# Patient Record
Sex: MALE | Race: WHITE | NOT HISPANIC OR LATINO | Employment: FULL TIME | URBAN - METROPOLITAN AREA
[De-identification: names, ages, dates, MRNs, and addresses within clinical notes are randomized per-mention and may not be internally consistent; named-entity substitution may affect disease eponyms.]

---

## 2023-03-08 PROBLEM — J30.9 ALLERGIC RHINITIS: Status: ACTIVE | Noted: 2023-03-08

## 2023-03-08 PROBLEM — G43.909 MIGRAINES: Status: ACTIVE | Noted: 2023-03-08

## 2023-03-08 PROBLEM — R35.0 INCREASED URINARY FREQUENCY: Status: ACTIVE | Noted: 2023-03-08

## 2023-03-08 PROBLEM — R63.1 EXCESSIVE THIRST: Status: ACTIVE | Noted: 2023-03-08

## 2023-03-08 PROBLEM — L30.9 ECZEMA: Status: ACTIVE | Noted: 2023-03-08

## 2023-03-08 RX ORDER — SUMATRIPTAN SUCCINATE 25 MG/1
TABLET ORAL
COMMUNITY
Start: 2021-08-18 | End: 2023-03-13 | Stop reason: SDUPTHER

## 2023-03-08 RX ORDER — FLUTICASONE PROPIONATE 50 MCG
1 SPRAY, SUSPENSION (ML) NASAL 2 TIMES DAILY
COMMUNITY
Start: 2021-08-18 | End: 2023-12-05 | Stop reason: WASHOUT

## 2023-03-08 RX ORDER — TRIAMCINOLONE ACETONIDE 1 MG/G
1 OINTMENT TOPICAL 2 TIMES DAILY
COMMUNITY
Start: 2021-08-18

## 2023-03-13 ENCOUNTER — OFFICE VISIT (OUTPATIENT)
Dept: PRIMARY CARE | Facility: CLINIC | Age: 23
End: 2023-03-13
Payer: COMMERCIAL

## 2023-03-13 VITALS
BODY MASS INDEX: 18.94 KG/M2 | WEIGHT: 125 LBS | HEIGHT: 68 IN | HEART RATE: 76 BPM | SYSTOLIC BLOOD PRESSURE: 110 MMHG | DIASTOLIC BLOOD PRESSURE: 70 MMHG

## 2023-03-13 DIAGNOSIS — J30.1 SEASONAL ALLERGIC RHINITIS DUE TO POLLEN: ICD-10-CM

## 2023-03-13 DIAGNOSIS — M77.11 LATERAL EPICONDYLITIS OF RIGHT ELBOW: ICD-10-CM

## 2023-03-13 DIAGNOSIS — G43.109 MIGRAINE AURA WITHOUT HEADACHE: ICD-10-CM

## 2023-03-13 DIAGNOSIS — M54.50 ACUTE LEFT-SIDED LOW BACK PAIN WITHOUT SCIATICA: Primary | ICD-10-CM

## 2023-03-13 PROBLEM — R35.0 INCREASED URINARY FREQUENCY: Status: RESOLVED | Noted: 2023-03-08 | Resolved: 2023-03-13

## 2023-03-13 PROCEDURE — 99214 OFFICE O/P EST MOD 30 MIN: CPT | Performed by: STUDENT IN AN ORGANIZED HEALTH CARE EDUCATION/TRAINING PROGRAM

## 2023-03-13 PROCEDURE — 1036F TOBACCO NON-USER: CPT | Performed by: STUDENT IN AN ORGANIZED HEALTH CARE EDUCATION/TRAINING PROGRAM

## 2023-03-13 RX ORDER — LIDOCAINE 50 MG/G
1 PATCH TOPICAL DAILY
Qty: 15 PATCH | Refills: 0 | Status: SHIPPED | OUTPATIENT
Start: 2023-03-13

## 2023-03-13 RX ORDER — METHYLPREDNISOLONE 4 MG/1
TABLET ORAL
Qty: 21 TABLET | Refills: 0 | Status: SHIPPED | OUTPATIENT
Start: 2023-03-13

## 2023-03-13 RX ORDER — SUMATRIPTAN SUCCINATE 25 MG/1
25 TABLET ORAL ONCE AS NEEDED
Qty: 9 TABLET | Refills: 2 | Status: SHIPPED | OUTPATIENT
Start: 2023-03-13

## 2023-03-13 RX ORDER — DICLOFENAC SODIUM 10 MG/G
4 GEL TOPICAL 3 TIMES DAILY PRN
Qty: 100 G | Refills: 0 | Status: SHIPPED | OUTPATIENT
Start: 2023-03-13

## 2023-03-13 NOTE — ASSESSMENT & PLAN NOTE
Prescription for Medrol Dosepak and lidocaine 5% patch has been sent to your pharmacy  We did send in for diclofenac 1% external gel, try this over a small are on your hand to see if you have any external reactions  Requisition for physical therapy has been placed within your chart

## 2023-03-13 NOTE — ASSESSMENT & PLAN NOTE
Continue sumatriptan 25 mg as needed for migraines  Your prescription has been sent to your pharmacy

## 2023-03-13 NOTE — PROGRESS NOTES
"Subjective   Patient ID: Mina Leavitt is a 22 y.o. male who presents for Back Pain and Hip Pain.  Today he is accompanied by alone.     HPI  Current Outpatient Medications on File Prior to Visit   Medication Sig Dispense Refill    fluticasone (Flonase) 50 mcg/actuation nasal spray Administer 1 spray into affected nostril(s) in the morning and 1 spray before bedtime.      SUMAtriptan (Imitrex) 25 mg tablet TAKE 1 TABLET FOR MIGRAINE RELIEF. MAY REPEAT EVERY 2 HOURS. MAX 200MG/DAY.      triamcinolone (Kenalog) 0.1 % ointment Apply 1 Application topically in the morning and 1 Application before bedtime.       No current facility-administered medications on file prior to visit.      Acute left-sided low back pain without sciatica  States that pain began couple weeks ago secondary to squats and incorrect form  Endorses dull achy pain relieved with Tylenol 1000 mg as needed  Denies any radiation or muscle weakness    2.  Migraine aura without headache  Currently on sumatriptan 25 mg tablet as needed for migraines  Denies any side effects of medication  Requesting for refill to be sent to their pharmacy    3.  Lateral epicondylitis of right elbow  States that he recently was doing tricep exercises in addition to racquetball and noticed pain on the outside of his elbow  Has not tried any over-the-counter interventions at this time  Requesting for additional interventions    Review of Systems  All pertinent positive symptoms are included in the history of present illness.    All other systems have been reviewed and are negative and noncontributory to this patient's current ailments.     Objective   /70 (BP Location: Left arm, Patient Position: Sitting, BP Cuff Size: Adult)   Pulse 76   Ht 1.727 m (5' 8\")   Wt 56.7 kg (125 lb)   BMI 19.01 kg/m²   BSA: 1.65 meters squared  Growth percentiles: Facility age limit for growth %shalini is 20 years. Facility age limit for growth %shalini is 20 years.   No visits with results " within 1 Month(s) from this visit.   Latest known visit with results is:   [facility]acy Encounter on 08/18/2021   Component Date Value Ref Range Status    TSH 08/18/2021 1.49  0.44 - 3.98 mIU/L Final    Comment:  TSH testing is performed using different testing    methodology at [facility] than at other    [facility] hospitals. Direct result comparisons should    only be made within the same method.      Hemoglobin A1C 08/18/2021 5.5  % Final    Comment:      Diagnosis of Diabetes-Adults   Non-Diabetic: < or = 5.6%   Increased risk for developing diabetes: 5.7-6.4%   Diagnostic of diabetes: > or = 6.5%  .       Monitoring of Diabetes                Age (y)     Therapeutic Goal (%)   Adults:          >18           <7.0   Pediatrics:    13-18           <7.5                   7-12           <8.0                   0- 6            7.5-8.5   American Diabetes Association. Diabetes Care 33(S1), Jan 2010.      Estimated Average Glucose 08/18/2021 111  MG/DL Final    Glucose 08/18/2021 78  74 - 99 mg/dL Final    Sodium 08/18/2021 138  136 - 145 mmol/L Final    Potassium 08/18/2021 4.3  3.5 - 5.3 mmol/L Final    Chloride 08/18/2021 102  98 - 107 mmol/L Final    Bicarbonate 08/18/2021 28  21 - 32 mmol/L Final    Anion Gap 08/18/2021 12  10 - 20 mmol/L Final    Urea Nitrogen 08/18/2021 15  6 - 23 mg/dL Final    Creatinine 08/18/2021 0.99  0.50 - 1.30 mg/dL Final    GLOMERULAR FILTRATION RATE-NON AFR* 08/18/2021 >60  >60 mL/min/1.73m2 Final    GLOMERULAR FILTRATION RATE-* 08/18/2021 >60  >60 mL/min/1.73m2 Final    Comment:  CALCULATIONS OF ESTIMATED GFR ARE PERFORMED   USING THE MDRD STUDY EQUATION FOR THE   IDMS-TRACEABLE CREATININE METHODS.   CLIN CHEM 2007;53:766-72      Calcium 08/18/2021 10.4 (H)  8.6 - 10.3 mg/dL Final    Albumin 08/18/2021 4.9  3.4 - 5.0 g/dL Final    Alkaline Phosphatase 08/18/2021 72  33 - 120 U/L Final    Total Protein 08/18/2021 7.2  6.4 - 8.2 g/dL Final    AST 08/18/2021 25  9 - 39 U/L  Final    Total Bilirubin 08/18/2021 0.8  0.0 - 1.2 mg/dL Final    ALT (SGPT) 08/18/2021 18  10 - 52 U/L Final    Comment:  Patients treated with Sulfasalazine may generate    falsely decreased results for ALT.      WBC 08/18/2021 6.3  4.4 - 11.3 x10E9/L Final    RBC 08/18/2021 5.37  4.50 - 5.90 x10E12/L Final    Hemoglobin 08/18/2021 16.7  13.5 - 17.5 g/dL Final    Hematocrit 08/18/2021 48.4  41.0 - 52.0 % Final    MCV 08/18/2021 90  80 - 100 fL Final    MCHC 08/18/2021 34.5  32.0 - 36.0 g/dL Final    Platelets 08/18/2021 229  150 - 450 x10E9/L Final    RDW 08/18/2021 11.3 (L)  11.5 - 14.5 % Final    Neutrophils % 08/18/2021 52.5  40.0 - 80.0 % Final    Immature Granulocytes %, Automated 08/18/2021 0.3  0.0 - 0.9 % Final    Comment:  Immature Granulocyte Count (IG) includes promyelocytes,    myelocytes and metamyelocytes but does not include bands.   Percent differential counts (%) should be interpreted in the   context of the absolute cell counts (cells/L).      Lymphocytes % 08/18/2021 35.0  13.0 - 44.0 % Final    Monocytes % 08/18/2021 9.2  2.0 - 10.0 % Final    Eosinophils % 08/18/2021 2.2  0.0 - 6.0 % Final    Basophils % 08/18/2021 0.8  0.0 - 2.0 % Final    Neutrophils Absolute 08/18/2021 3.32  1.20 - 7.70 x10E9/L Final    Lymphocytes Absolute 08/18/2021 2.21  1.20 - 4.80 x10E9/L Final    Monocytes Absolute 08/18/2021 0.58  0.10 - 1.00 x10E9/L Final    Eosinophils Absolute 08/18/2021 0.14  0.00 - 0.70 x10E9/L Final    Basophils Absolute 08/18/2021 0.05  0.00 - 0.10 x10E9/L Final    Cholesterol 08/18/2021 171  0 - 199 mg/dL Final    Comment: .      AGE      DESIRABLE   BORDERLINE HIGH   HIGH     0-19 Y     0 - 169       170 - 199     >/= 200    20-24 Y     0 - 189       190 - 224     >/= 225         >24 Y     0 - 199       200 - 239     >/= 240   **All ranges are based on fasting samples. Specific   therapeutic targets will vary based on patient-specific   cardiac risk.  .   Pediatric guidelines  reference:Pediatrics 2011, 128(S5).   Adult guidelines reference: NCEP ATPIII Guidelines,     RAVINDER 2001, 258:2486-97  .   Venipuncture immediately after or during the    administration of Metamizole may lead to falsely   low results. Testing should be performed immediately   prior to Metamizole dosing.      HDL 08/18/2021 66.0  mg/dL Final    Comment: .      AGE      VERY LOW   LOW     NORMAL    HIGH       0-19 Y       < 35   < 40     40-45     ----    20-24 Y       ----   < 40       >45     ----      >24 Y       ----   < 40     40-60      >60  .      Cholesterol/HDL Ratio 08/18/2021 2.6   Final    Comment: REF VALUES  DESIRABLE  < 3.4  HIGH RISK  > 5.0      LDL 08/18/2021 91  0 - 109 mg/dL Final    Comment: .                           NEAR      BORD      AGE      DESIRABLE  OPTIMAL    HIGH     HIGH     VERY HIGH     0-19 Y     0 - 109     ---    110-129   >/= 130     ----    20-24 Y     0 - 119     ---    120-159   >/= 160     ----      >24 Y     0 -  99   100-129  130-159   160-189     >/=190  .      VLDL 08/18/2021 14  0 - 40 mg/dL Final    Triglycerides 08/18/2021 72  0 - 149 mg/dL Final    Comment: .      AGE      DESIRABLE   BORDERLINE HIGH   HIGH     VERY HIGH   0 D-90 D    19 - 174         ----         ----        ----  91 D- 9 Y     0 -  74        75 -  99     >/= 100      ----    10-19 Y     0 -  89        90 - 129     >/= 130      ----    20-24 Y     0 - 114       115 - 149     >/= 150      ----         >24 Y     0 - 149       150 - 199    200- 499    >/= 500  .   Venipuncture immediately after or during the    administration of Metamizole may lead to falsely   low results. Testing should be performed immediately   prior to Metamizole dosing.      Non HDL Cholesterol 08/18/2021 105  0 - 119 mg/dL Final    Comment:     AGE      DESIRABLE   BORDERLINE HIGH   HIGH     VERY HIGH     0-19 Y     0 - 119       120 - 144     >/= 145    >/= 160    20-24 Y     0 - 149       150 - 189     >/= 190      ----          >24 Y    30 MG/DL ABOVE LDL CHOLESTEROL GOAL  .         Physical Exam    CONSTITUTIONAL - well nourished, well developed, looks like stated age and in no distress  SKIN - no rash, no lesions, warm to touch and normal turgor  HEAD - no trauma, normocephalic  EYE - PERRL and EOMI with normal external exam  NECK - supple, no rigidity and no thyromegaly  CHEST - clear to auscultation without wheezing, crackles or rales  CARDIAC - normal heart sounds and physiologic rhythm without murmurs  ABDOMEN - no organomegaly, soft, nontender, nondistended, normal bowel sounds, no guarding/rebound/rigidity  EXTREMITIES - no edema, no deformities, rotator cuff and upper extremity strength 5/5 and no scoliosis  NEUROLOGICAL - normal gait, normal balance, normal motor, no ataxia, DTR equal and symmetrical, alert, oriented, no focal signs and able to 1 hop/duck walk  PSYCHIATRIC - alert, oriented, pleasant and cordial    Assessment/Plan   Problem List Items Addressed This Visit          Musculoskeletal    Lateral epicondylitis of right elbow     Prescription for Medrol Dosepak and lidocaine 5% patch has been sent to your pharmacy  We did send in for diclofenac 1% external gel, try this over a small are on your hand to see if you have any external reactions  Requisition for physical therapy has been placed within your chart              Other    Allergic rhinitis     Continue fluticasone 50 mcg per actuation 1 spray bilaterally twice daily           Migraine aura without headache     Continue sumatriptan 25 mg as needed for migraines  Your prescription has been sent to your pharmacy           Acute left-sided low back pain without sciatica - Primary     Prescription for Medrol Dosepak and lidocaine 5% patch has been sent to your pharmacy  We did send in for diclofenac 1% external gel, try this over a small are on your hand to see if you have any external reactions  Requisition for physical therapy has been placed within your chart

## 2023-12-05 ENCOUNTER — TELEMEDICINE (OUTPATIENT)
Dept: PRIMARY CARE | Facility: CLINIC | Age: 23
End: 2023-12-05
Payer: COMMERCIAL

## 2023-12-05 DIAGNOSIS — R05.1 ACUTE COUGH: ICD-10-CM

## 2023-12-05 DIAGNOSIS — J32.9 SINUSITIS, UNSPECIFIED CHRONICITY, UNSPECIFIED LOCATION: Primary | ICD-10-CM

## 2023-12-05 DIAGNOSIS — R09.81 SINUS CONGESTION: ICD-10-CM

## 2023-12-05 PROCEDURE — 99214 OFFICE O/P EST MOD 30 MIN: CPT | Performed by: STUDENT IN AN ORGANIZED HEALTH CARE EDUCATION/TRAINING PROGRAM

## 2023-12-05 RX ORDER — AMOXICILLIN AND CLAVULANATE POTASSIUM 875; 125 MG/1; MG/1
875 TABLET, FILM COATED ORAL 2 TIMES DAILY
Qty: 14 TABLET | Refills: 0 | Status: SHIPPED | OUTPATIENT
Start: 2023-12-05

## 2023-12-05 NOTE — PROGRESS NOTES
Subjective   Patient ID: Mina Leavitt is a 23 y.o. male who presents for URI.    Patient presents with cold-like symptoms for over a week.  Started last Monday night, today would be day 9.  Patient had symptoms of congestion, sinus pressure, general fatigue, sore throat on and off as well as a mild cough.  His congestion has improved slightly.  Denies any fever nausea, chest pain, abdominal pain, ear pain, plugged ear sensation.  Has been using over-the-counter decongestions with minimal help.          Review of Systems    Objective   There were no vitals taken for this visit.    Physical Exam  CONSTITUTIONAL - well nourished, well developed, looks like stated age, in no acute distress, not ill-appearing, and not tired appearing  SKIN - normal skin color and pigmentation, normal skin turgor without rash, lesions, or nodules visualized  HEAD - no trauma, normocephalic  EYES - normal external exam  CHEST -no distressed breathing, good effort  EXTREMITIES - no edema, no deformities  NEUROLOGICAL - normal balance, normal motor, no ataxia  PSYCHIATRIC - alert, pleasant and cordial, age-appropriate    Assessment/Plan     We will send a prescription for Augmentin for 7 days to your pharmacy.  In addition, you may use your Flonase as needed.     Risks, benefits, and options of treatment(s) were discussed after reviewing all current medication(s) and drug allergy(ies)    I opted for the treatment that we discussed with instructions on the medication use for your underlying medical ailment(s)    I encouraged supportive care such as rest, fluids and Advil/Tylenol as warranted    Return to the clinic in 7-10 days or sooner if symptoms worsen or persist as we will then further evaluate

## 2024-10-02 ENCOUNTER — APPOINTMENT (OUTPATIENT)
Dept: RADIOLOGY | Facility: CLINIC | Age: 24
End: 2024-10-02
Payer: COMMERCIAL

## 2024-10-02 ENCOUNTER — HOSPITAL ENCOUNTER (OUTPATIENT)
Dept: RADIOLOGY | Facility: CLINIC | Age: 24
Discharge: HOME | End: 2024-10-02
Payer: COMMERCIAL

## 2024-10-02 ENCOUNTER — OFFICE VISIT (OUTPATIENT)
Dept: ORTHOPEDIC SURGERY | Facility: CLINIC | Age: 24
End: 2024-10-02
Payer: COMMERCIAL

## 2024-10-02 VITALS — HEIGHT: 68 IN | WEIGHT: 125 LBS | BODY MASS INDEX: 18.94 KG/M2

## 2024-10-02 DIAGNOSIS — G89.29 HIP PAIN, CHRONIC, LEFT: Primary | ICD-10-CM

## 2024-10-02 DIAGNOSIS — M25.552 HIP PAIN, CHRONIC, LEFT: Primary | ICD-10-CM

## 2024-10-02 DIAGNOSIS — G89.29 HIP PAIN, CHRONIC, LEFT: ICD-10-CM

## 2024-10-02 DIAGNOSIS — M25.552 HIP PAIN, CHRONIC, LEFT: ICD-10-CM

## 2024-10-02 PROCEDURE — 73523 X-RAY EXAM HIPS BI 5/> VIEWS: CPT

## 2024-10-02 PROCEDURE — 3008F BODY MASS INDEX DOCD: CPT | Performed by: ORTHOPAEDIC SURGERY

## 2024-10-02 PROCEDURE — 1036F TOBACCO NON-USER: CPT | Performed by: ORTHOPAEDIC SURGERY

## 2024-10-02 PROCEDURE — 73523 X-RAY EXAM HIPS BI 5/> VIEWS: CPT | Mod: BILATERAL PROCEDURE | Performed by: RADIOLOGY

## 2024-10-02 PROCEDURE — 99204 OFFICE O/P NEW MOD 45 MIN: CPT | Performed by: ORTHOPAEDIC SURGERY

## 2024-10-02 PROCEDURE — 72170 X-RAY EXAM OF PELVIS: CPT

## 2024-10-02 PROCEDURE — 99214 OFFICE O/P EST MOD 30 MIN: CPT | Mod: 25 | Performed by: ORTHOPAEDIC SURGERY

## 2024-10-02 ASSESSMENT — PAIN SCALES - GENERAL: PAINLEVEL_OUTOF10: 5 - MODERATE PAIN

## 2024-10-02 ASSESSMENT — PAIN DESCRIPTION - DESCRIPTORS: DESCRIPTORS: SHARP;ACHING

## 2024-10-02 ASSESSMENT — PAIN - FUNCTIONAL ASSESSMENT: PAIN_FUNCTIONAL_ASSESSMENT: 0-10

## 2024-10-02 NOTE — PROGRESS NOTES
24-year-old male presenting with a 1 to 2 years of left hip pain.  He has tried multiple things such as physical therapy, activity modification, over-the-counter anti-inflammatory medication and other home remedies.  He continues to have pain mostly in his groin and anterior lateral hip.  He has had progressive pain is been worsening over the years to the point where now even prolonged sitting and standing causing pain.  He is at the alter his work schedule as well as change the way he stands and sits at his desk.  His pain is gotten to the point where he is seeking medical care from surgeons.  He has a C sign.  His parents live here in [place] but he is at work in [place].    The patients full medical history, surgical history, medications, allergies, family, medical history, social history, and a complete 30 point review of systems is documented in the medical record on the signed, scanned medical intake sheet or reviewed in the history of present illness.    Gen: The patient is alert and oriented ×3, is in no acute distress, and appear their stated age and weight.    Psychiatric: Mood and affect are appropriate.    Eyes: Sclera are white, and pupils are round and symmetric.    ENT: Mucous membranes are moist.     Neck: Supple. Thyroid is midline.    Respiratory: Respirations are nonlabored, chest rise is symmetric.    Cardiac: Rate is regular by palpation of distal pulses.     Abdomen: Nondistended.    Integument: No obvious cutaneous lesions are noted. No signs of lymphangitis. No signs of systemic edema.    Gait and stance examination demonstrate a reciprocal heel toe gait. Trendelenburg sign is negative.    Left hip examination reveals 120 degrees of flexion, 10 degrees of internal rotation, 60 degrees of external rotation, and 50 degrees of abduction. Anterior impingement sign is positive.  Posterior impingement sign is negative. Subspine impingement sign is negative. JESSI test is negative. [facility]  test is positive. Nancy test is negative. There is no tenderness to palpation over the pubic symphysis, anterior groin, greater trochanter, or gluteal region. Posterior apprehension is positive. Anterior apprehension is negative. Hip flexion strength is 5 out of 5. Adductor strength is 5 out of 5. Abduction strength is 5 out of 5 in the lateral position. Pelvic obliquity is level.    Distally, ankle dorsiflexion and plantarflexion as well as great toe extension is 5 out of 5. Sensation is intact to light touch in the tibial, sural, saphenous, superficial peroneal, and deep peroneal nerve distributions. The foot is warm and well-perfused with palpable pedal pulses. There is no obvious edema present. Skin is warm, dry and intact.    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  He states he has an outside MRI and CT scan which she did not bring with him today.  His hip demonstrates cranial retroversion as well as marked posterior undercoverage and a shallow center edge angle.    24-year-old male who is presenting here as a second opinion.  He was previously seeing Dr. Moyer at [facility].  He was recommended to have combined VERONIQUE and hip arthroscopy.  He is unsure if he can have a surgery in [place] or [place].  I like to get his MRI and CT scan to review and have him also follow-up with Dr. Lynn and myself for combination appointment.  He has impressive pathology that would likely benefit from combination procedure pending any sort of extreme cartilage loss on his MRI.  Will see him back in 2 to 3 weeks for combined appointment.

## 2024-10-30 ENCOUNTER — OFFICE VISIT (OUTPATIENT)
Dept: ORTHOPEDIC SURGERY | Facility: CLINIC | Age: 24
End: 2024-10-30
Payer: COMMERCIAL

## 2024-10-30 ENCOUNTER — OFFICE VISIT (OUTPATIENT)
Dept: ORTHOPEDIC SURGERY | Facility: HOSPITAL | Age: 24
End: 2024-10-30
Payer: COMMERCIAL

## 2024-10-30 VITALS — WEIGHT: 125 LBS | BODY MASS INDEX: 18.94 KG/M2 | HEIGHT: 68 IN

## 2024-10-30 DIAGNOSIS — M25.859 FEMORAL ACETABULAR IMPINGEMENT: Primary | ICD-10-CM

## 2024-10-30 PROCEDURE — 99213 OFFICE O/P EST LOW 20 MIN: CPT | Performed by: ORTHOPAEDIC SURGERY

## 2024-10-30 PROCEDURE — 99214 OFFICE O/P EST MOD 30 MIN: CPT | Performed by: ORTHOPAEDIC SURGERY

## 2024-10-30 PROCEDURE — 3008F BODY MASS INDEX DOCD: CPT | Performed by: ORTHOPAEDIC SURGERY

## 2024-10-30 ASSESSMENT — PAIN SCALES - GENERAL: PAINLEVEL_OUTOF10: 4

## 2024-10-30 ASSESSMENT — PAIN DESCRIPTION - DESCRIPTORS: DESCRIPTORS: SHARP;ACHING

## 2024-10-30 ASSESSMENT — PAIN - FUNCTIONAL ASSESSMENT: PAIN_FUNCTIONAL_ASSESSMENT: 0-10

## 2024-11-07 DIAGNOSIS — G43.109 MIGRAINE AURA WITHOUT HEADACHE: ICD-10-CM

## 2024-11-07 RX ORDER — SUMATRIPTAN SUCCINATE 25 MG/1
25 TABLET ORAL ONCE AS NEEDED
Qty: 9 TABLET | Refills: 0 | Status: SHIPPED | OUTPATIENT
Start: 2024-11-07

## 2024-12-16 ENCOUNTER — OFFICE VISIT (OUTPATIENT)
Dept: ORTHOPEDIC SURGERY | Facility: HOSPITAL | Age: 24
End: 2024-12-16
Payer: COMMERCIAL

## 2024-12-16 DIAGNOSIS — S73.192A ACETABULAR LABRUM TEAR, LEFT, INITIAL ENCOUNTER: Primary | ICD-10-CM

## 2024-12-16 PROCEDURE — L1686 HO POST-OP HIP ABDUCTION: HCPCS | Performed by: ORTHOPAEDIC SURGERY

## 2024-12-16 PROCEDURE — 99213 OFFICE O/P EST LOW 20 MIN: CPT | Performed by: ORTHOPAEDIC SURGERY

## 2024-12-16 PROCEDURE — E0114 CRUTCH UNDERARM PAIR NO WOOD: HCPCS | Performed by: ORTHOPAEDIC SURGERY

## 2024-12-16 NOTE — PROGRESS NOTES
Is a 24-year-old gentleman following up for preoperative discussion regarding left hip arthroscopy with rim trim, femoral chondroplasty, labral repair.  He continues to note limitations to the left hip that are keeping him from exercising and engaging in the activities that he wishes to participate in. Patient has exhausted conservative management including therapeutic exercises, activity modification, NSAIDS.  They continue to have worsening deep groin pain with mechanical symptoms affecting ADLs.  Patient would like to proceed with surgery entailing a hip arthroscopy, acetabuloplasty for acetabular retroversion, labral repair, femoroplasty, loose body removal for possible cartilaginous loose body seen on MRI, and capsular plication for mild hip instability.    We discussed the risks and benefits of surgery which included but were not limited to bleeding, infection, damage to nerves, damage to blood vessels, need for further procedures, risks of anesthesia, heterotopic ossification, femoral neck stress fracture, avascular necrosis of the femoral head, pudendal nerve palsy iatrogenic instability progression of osteoarthritis.    Patient was prescribed a hinged hip brace for WEST/labral tear.  The patient is ambulatory with or without aid; but, has weakness, instability and/or deformity of their left hip which requires stabilization from this orthosis to improve their function.      Verbal and written instructions for the use, wear schedule, cleaning and application of this item were given.  Patient was instructed that should the brace result in increased pain, decreased sensation, increased swelling, or an overall worsening of their medical condition, to please contact our office immediately.     Patient was prescribed crutches for WEST/labral tear.  This mobility device is required for the following reasons:    1. The patient has a mobility limitation that significantly impairs their ability to participate in one or  more mobility-related activities of daily living (MRADL) in the home; and  2. The patient is able to safely use the mobility device; and  3. The functional mobility deficit can be sufficiently resolved with use of the mobility device    Verbal and written instructions for the use, wear schedule, cleaning and application of this item were given.  Education provided also included gait training and safety precautions when using this device. Patient was instructed that should the item result in increased pain, decreased sensation, increased swelling, or an overall worsening of their medical condition, to please contact our office immediately.    Orthotic management and training was provided for skin care, modifications due to healing tissues, edema changes, interruption in skin integrity, and safety precautions with the orthosis.

## 2024-12-16 NOTE — LETTER
December 23, 2024     Patient: Mina Leavitt   YOB: 2000   Date of Visit: 12/16/2024       To Whom it May Concern:    Mina Leavitt was seen in my clinic on 12/16/2024. He is having surgery on 01/24/2025 and will be out of work for 6 weeks.    If you have any questions or concerns, please don't hesitate to call.         Sincerely,          Jose Luis Lynn MD        CC: No Recipients

## 2024-12-19 PROBLEM — M25.852 FEMOROACETABULAR IMPINGEMENT OF LEFT HIP: Status: ACTIVE | Noted: 2024-12-16

## 2024-12-19 PROBLEM — M24.052 LOOSE BODY IN LEFT HIP: Status: ACTIVE | Noted: 2024-12-16

## 2024-12-19 PROBLEM — S73.192A TEAR OF LEFT ACETABULAR LABRUM: Status: ACTIVE | Noted: 2024-12-16

## 2025-01-08 ENCOUNTER — LAB (OUTPATIENT)
Dept: LAB | Facility: LAB | Age: 25
End: 2025-01-08
Payer: COMMERCIAL

## 2025-01-08 DIAGNOSIS — S73.192A ACETABULAR LABRUM TEAR, LEFT, INITIAL ENCOUNTER: ICD-10-CM

## 2025-01-08 PROCEDURE — 85025 COMPLETE CBC W/AUTO DIFF WBC: CPT

## 2025-01-08 PROCEDURE — 80053 COMPREHEN METABOLIC PANEL: CPT

## 2025-01-09 LAB
ALBUMIN SERPL BCP-MCNC: 5 G/DL (ref 3.4–5)
ALP SERPL-CCNC: 65 U/L (ref 33–120)
ALT SERPL W P-5'-P-CCNC: 31 U/L (ref 10–52)
ANION GAP SERPL CALC-SCNC: 12 MMOL/L (ref 10–20)
AST SERPL W P-5'-P-CCNC: 25 U/L (ref 9–39)
BASOPHILS # BLD AUTO: 0.07 X10*3/UL (ref 0–0.1)
BASOPHILS NFR BLD AUTO: 1.1 %
BILIRUB SERPL-MCNC: 0.6 MG/DL (ref 0–1.2)
BUN SERPL-MCNC: 12 MG/DL (ref 6–23)
CALCIUM SERPL-MCNC: 10.8 MG/DL (ref 8.6–10.6)
CHLORIDE SERPL-SCNC: 100 MMOL/L (ref 98–107)
CO2 SERPL-SCNC: 33 MMOL/L (ref 21–32)
CREAT SERPL-MCNC: 1.06 MG/DL (ref 0.5–1.3)
EGFRCR SERPLBLD CKD-EPI 2021: >90 ML/MIN/1.73M*2
EOSINOPHIL # BLD AUTO: 0.11 X10*3/UL (ref 0–0.7)
EOSINOPHIL NFR BLD AUTO: 1.8 %
ERYTHROCYTE [DISTWIDTH] IN BLOOD BY AUTOMATED COUNT: 11.5 % (ref 11.5–14.5)
GLUCOSE SERPL-MCNC: 75 MG/DL (ref 74–99)
HCT VFR BLD AUTO: 48 % (ref 41–52)
HGB BLD-MCNC: 16.2 G/DL (ref 13.5–17.5)
IMM GRANULOCYTES # BLD AUTO: 0.01 X10*3/UL (ref 0–0.7)
IMM GRANULOCYTES NFR BLD AUTO: 0.2 % (ref 0–0.9)
LYMPHOCYTES # BLD AUTO: 2.19 X10*3/UL (ref 1.2–4.8)
LYMPHOCYTES NFR BLD AUTO: 35.4 %
MCH RBC QN AUTO: 30.1 PG (ref 26–34)
MCHC RBC AUTO-ENTMCNC: 33.8 G/DL (ref 32–36)
MCV RBC AUTO: 89 FL (ref 80–100)
MONOCYTES # BLD AUTO: 0.67 X10*3/UL (ref 0.1–1)
MONOCYTES NFR BLD AUTO: 10.8 %
NEUTROPHILS # BLD AUTO: 3.14 X10*3/UL (ref 1.2–7.7)
NEUTROPHILS NFR BLD AUTO: 50.7 %
NRBC BLD-RTO: 0 /100 WBCS (ref 0–0)
PLATELET # BLD AUTO: 262 X10*3/UL (ref 150–450)
POTASSIUM SERPL-SCNC: 4.2 MMOL/L (ref 3.5–5.3)
PROT SERPL-MCNC: 7.3 G/DL (ref 6.4–8.2)
RBC # BLD AUTO: 5.38 X10*6/UL (ref 4.5–5.9)
SODIUM SERPL-SCNC: 141 MMOL/L (ref 136–145)
WBC # BLD AUTO: 6.2 X10*3/UL (ref 4.4–11.3)

## 2025-01-17 DIAGNOSIS — S73.192A ACETABULAR LABRUM TEAR, LEFT, INITIAL ENCOUNTER: Primary | ICD-10-CM

## 2025-01-17 DIAGNOSIS — M25.859 FEMORAL ACETABULAR IMPINGEMENT: ICD-10-CM

## 2025-01-22 ENCOUNTER — APPOINTMENT (OUTPATIENT)
Dept: PRIMARY CARE | Facility: CLINIC | Age: 25
End: 2025-01-22
Payer: COMMERCIAL

## 2025-01-23 ENCOUNTER — ANESTHESIA EVENT (OUTPATIENT)
Dept: OPERATING ROOM | Facility: HOSPITAL | Age: 25
End: 2025-01-23
Payer: COMMERCIAL

## 2025-01-23 ENCOUNTER — OFFICE VISIT (OUTPATIENT)
Dept: PRIMARY CARE | Facility: CLINIC | Age: 25
End: 2025-01-23
Payer: COMMERCIAL

## 2025-01-23 VITALS
BODY MASS INDEX: 19.1 KG/M2 | HEIGHT: 68 IN | SYSTOLIC BLOOD PRESSURE: 102 MMHG | DIASTOLIC BLOOD PRESSURE: 70 MMHG | HEART RATE: 90 BPM | OXYGEN SATURATION: 99 % | WEIGHT: 126 LBS

## 2025-01-23 DIAGNOSIS — M24.052 LOOSE BODY IN LEFT HIP: ICD-10-CM

## 2025-01-23 DIAGNOSIS — Z00.00 HEALTHCARE MAINTENANCE: Primary | ICD-10-CM

## 2025-01-23 DIAGNOSIS — M25.852 FEMOROACETABULAR IMPINGEMENT OF LEFT HIP: ICD-10-CM

## 2025-01-23 DIAGNOSIS — L98.9 SKIN LESION: ICD-10-CM

## 2025-01-23 DIAGNOSIS — G43.109 MIGRAINE AURA WITHOUT HEADACHE: ICD-10-CM

## 2025-01-23 DIAGNOSIS — M54.50 ACUTE LEFT-SIDED LOW BACK PAIN WITHOUT SCIATICA: ICD-10-CM

## 2025-01-23 DIAGNOSIS — M25.512 ACUTE PAIN OF LEFT SHOULDER: ICD-10-CM

## 2025-01-23 PROCEDURE — 1036F TOBACCO NON-USER: CPT | Performed by: STUDENT IN AN ORGANIZED HEALTH CARE EDUCATION/TRAINING PROGRAM

## 2025-01-23 PROCEDURE — 99213 OFFICE O/P EST LOW 20 MIN: CPT | Performed by: STUDENT IN AN ORGANIZED HEALTH CARE EDUCATION/TRAINING PROGRAM

## 2025-01-23 PROCEDURE — 99395 PREV VISIT EST AGE 18-39: CPT | Performed by: STUDENT IN AN ORGANIZED HEALTH CARE EDUCATION/TRAINING PROGRAM

## 2025-01-23 PROCEDURE — 3008F BODY MASS INDEX DOCD: CPT | Performed by: STUDENT IN AN ORGANIZED HEALTH CARE EDUCATION/TRAINING PROGRAM

## 2025-01-23 RX ORDER — SUMATRIPTAN SUCCINATE 25 MG/1
25 TABLET ORAL ONCE AS NEEDED
Qty: 27 TABLET | Refills: 3 | Status: SHIPPED | OUTPATIENT
Start: 2025-01-23

## 2025-01-23 RX ORDER — CLOTRIMAZOLE AND BETAMETHASONE DIPROPIONATE 10; .64 MG/G; MG/G
1 CREAM TOPICAL 2 TIMES DAILY
Qty: 15 G | Refills: 0 | Status: SHIPPED | OUTPATIENT
Start: 2025-01-23

## 2025-01-23 ASSESSMENT — PATIENT HEALTH QUESTIONNAIRE - PHQ9
2. FEELING DOWN, DEPRESSED OR HOPELESS: NOT AT ALL
1. LITTLE INTEREST OR PLEASURE IN DOING THINGS: NOT AT ALL
SUM OF ALL RESPONSES TO PHQ9 QUESTIONS 1 AND 2: 0

## 2025-01-23 NOTE — PROGRESS NOTES
Subjective   Patient ID: Mina Leavitt is a 24 y.o. male who presents for Annual physical.  Today he is accompanied by alone.     HPI  Healthcare maintenance  Overall doing well  UTD on vaccines, declines flu shot  Having surgery tomorrow    2.  Migraine aura without headache  Currently on sumatriptan 25 mg tablet as needed for migraines  Denies any side effects of medication  Requesting for refill to be sent to their pharmacy     3. Presurgical Clearance  Having surgery for a torn left acetabular labrum, femoroacetabular impingement of the left hip.  Left hip arthroscopy with rim trim, femoral chondroplasty, labral repair to be done tomorrow.    4.  Left shoulder pain  States that he did follow-up with an orthopedic provider and even x-rays were performed at that time  Also followed with physical therapy and told him that he had no major issues  He continues to have some weakness and some pain while lifting objects including lighter objects such as groceries  Wishing to discuss this further at today's visit    5.  Right ankle skin lesion  States that he notices a slightly darkened skin lesion that he feels could even be psoriasis/eczema  Does not know the length of time of this being present  Asking to be further evaluated    6.  Low back pain  Stated that he went to the gym just the other day, within the past week, and stated that he was doing overhead exercising including hanging from a bar  Ultimately feels a tightness in his back that is within his paraspinal muscles  Denies any radiation but wishes to discuss this as well        Current Outpatient Medications on File Prior to Visit   Medication Sig Dispense Refill    diclofenac sodium (Voltaren) 1 % gel gel Apply 1 Application topically 3 times a day as needed (for muscle ache). 100 g 0    lidocaine (Lidoderm) 5 % patch Place 1 patch over 12 hours on the skin once daily. 15 patch 0    triamcinolone (Kenalog) 0.1 % ointment Apply 1 Application topically  in the morning and 1 Application before bedtime.      [DISCONTINUED] amoxicillin-pot clavulanate (Augmentin) 875-125 mg tablet Take 1 tablet (875 mg) by mouth 2 times a day. 14 tablet 0    [DISCONTINUED] methylPREDNISolone (Medrol, Bridger,) 4 mg tablets Follow schedule on package instructions 21 tablet 0    [DISCONTINUED] SUMAtriptan (Imitrex) 25 mg tablet Take 1 tablet (25 mg) by mouth 1 time if needed for migraine. May repeat dose once in 2 hours if no relief.  Do not exceed 2 doses in 24 hours. 9 tablet 0     No current facility-administered medications on file prior to visit.        Allergies   Allergen Reactions    Grass Pollen Unknown    Ibuprofen Rash    Pollen Extracts Unknown       Immunization History   Administered Date(s) Administered    DTaP vaccine, pediatric  (INFANRIX) 2000, 01/18/2001, 03/19/2001, 01/28/2002, 09/02/2005, 01/18/2021    Flu vaccine (IIV4), preservative free *Check age/dose* 10/25/2017, 10/24/2018    HPV, Quadrivalent 12/02/2014, 02/09/2015, 06/10/2015    Hep A, Unspecified 12/02/2014, 06/10/2015    Hep B, Unspecified 2000, 03/19/2001, 06/25/2001    HiB, unspecified 2000, 01/18/2001, 03/19/2001, 01/28/2002    Influenza, live, intranasal 11/19/2013, 12/02/2014    Influenza, seasonal, injectable 10/24/2018    MMR vaccine, subcutaneous (MMR II) 09/24/2001, 09/19/2002, 12/06/2004    Meningococcal ACWY vaccine (MENVEO) 10/07/2011, 10/25/2017    Meningococcal B vaccine (BEXSERO) 10/25/2017, 10/24/2018    Pfizer Purple Cap SARS-CoV-2 03/24/2021, 04/15/2021    Pneumococcal conjugate vaccine, 13-valent (PREVNAR 13) 2000, 01/18/2001, 03/19/2001, 01/28/2002    Poliovirus vaccine, subcutaneous (IPOL) 2000, 01/18/2001, 04/29/2002, 09/02/2005    Tdap vaccine, age 7 year and older (BOOSTRIX, ADACEL) 10/07/2011, 10/24/2018, 03/04/2022    Varicella vaccine, subcutaneous (VARIVAX) 09/24/2001, 10/07/2011         Review of Systems  All pertinent positive symptoms are included in  "the history of present illness.  All other systems have been reviewed and are negative and noncontributory to this patient's current ailments.     Objective   /70 (BP Location: Left arm, Patient Position: Sitting, BP Cuff Size: Adult)   Pulse 90   Ht 1.727 m (5' 8\")   Wt 57.2 kg (126 lb)   SpO2 99%   BMI 19.16 kg/m²   BSA: 1.66 meters squared  Lab on 01/08/2025   Component Date Value Ref Range Status    Glucose 01/08/2025 75  74 - 99 mg/dL Final    Sodium 01/08/2025 141  136 - 145 mmol/L Final    Potassium 01/08/2025 4.2  3.5 - 5.3 mmol/L Final    Chloride 01/08/2025 100  98 - 107 mmol/L Final    Bicarbonate 01/08/2025 33 (H)  21 - 32 mmol/L Final    Anion Gap 01/08/2025 12  10 - 20 mmol/L Final    Urea Nitrogen 01/08/2025 12  6 - 23 mg/dL Final    Creatinine 01/08/2025 1.06  0.50 - 1.30 mg/dL Final    eGFR 01/08/2025 >90  >60 mL/min/1.73m*2 Final    Calculations of estimated GFR are performed using the 2021 CKD-EPI Study Refit equation without the race variable for the IDMS-Traceable creatinine methods.  https://jasn.asnjournals.org/content/early/2021/09/22/ASN.6654005045    Calcium 01/08/2025 10.8 (H)  8.6 - 10.6 mg/dL Final    Albumin 01/08/2025 5.0  3.4 - 5.0 g/dL Final    Alkaline Phosphatase 01/08/2025 65  33 - 120 U/L Final    Total Protein 01/08/2025 7.3  6.4 - 8.2 g/dL Final    AST 01/08/2025 25  9 - 39 U/L Final    Bilirubin, Total 01/08/2025 0.6  0.0 - 1.2 mg/dL Final    ALT 01/08/2025 31  10 - 52 U/L Final    Patients treated with Sulfasalazine may generate falsely decreased results for ALT.    WBC 01/08/2025 6.2  4.4 - 11.3 x10*3/uL Final    nRBC 01/08/2025 0.0  0.0 - 0.0 /100 WBCs Final    RBC 01/08/2025 5.38  4.50 - 5.90 x10*6/uL Final    Hemoglobin 01/08/2025 16.2  13.5 - 17.5 g/dL Final    Hematocrit 01/08/2025 48.0  41.0 - 52.0 % Final    MCV 01/08/2025 89  80 - 100 fL Final    MCH 01/08/2025 30.1  26.0 - 34.0 pg Final    MCHC 01/08/2025 33.8  32.0 - 36.0 g/dL Final    RDW 01/08/2025 " 11.5  11.5 - 14.5 % Final    Platelets 01/08/2025 262  150 - 450 x10*3/uL Final    Neutrophils % 01/08/2025 50.7  40.0 - 80.0 % Final    Immature Granulocytes %, Automated 01/08/2025 0.2  0.0 - 0.9 % Final    Immature Granulocyte Count (IG) includes promyelocytes, myelocytes and metamyelocytes but does not include bands. Percent differential counts (%) should be interpreted in the context of the absolute cell counts (cells/UL).    Lymphocytes % 01/08/2025 35.4  13.0 - 44.0 % Final    Monocytes % 01/08/2025 10.8  2.0 - 10.0 % Final    Eosinophils % 01/08/2025 1.8  0.0 - 6.0 % Final    Basophils % 01/08/2025 1.1  0.0 - 2.0 % Final    Neutrophils Absolute 01/08/2025 3.14  1.20 - 7.70 x10*3/uL Final    Percent differential counts (%) should be interpreted in the context of the absolute cell counts (cells/uL).    Immature Granulocytes Absolute, Au* 01/08/2025 0.01  0.00 - 0.70 x10*3/uL Final    Lymphocytes Absolute 01/08/2025 2.19  1.20 - 4.80 x10*3/uL Final    Monocytes Absolute 01/08/2025 0.67  0.10 - 1.00 x10*3/uL Final    Eosinophils Absolute 01/08/2025 0.11  0.00 - 0.70 x10*3/uL Final    Basophils Absolute 01/08/2025 0.07  0.00 - 0.10 x10*3/uL Final       Physical Exam  CONSTITUTIONAL - well nourished, well developed, looks like stated age, in no acute distress, not ill-appearing, and not tired appearing  SKIN - normal skin color and pigmentation, noted a slightly darkened well demarcated lesion approximately 2 cm x 1 cm in size, slightly dry  HEAD - no trauma, normocephalic  EYES - normal external exam  ENT - TM's intact, no injection, no signs of infection, uvula midline, normal tongue movement and throat normal, no exudate, nasal passage without discharge and patent  CHEST - clear to auscultation, no wheezing, no crackles and no rales, good effort  CARDIAC - regular rate and regular rhythm, no skipped beats, no murmur  ABDOMEN - no organomegaly, soft, nontender, nondistended, normal bowel sounds, no  guarding/rebound/rigidity  EXTREMITIES - no edema, no deformities  NEUROLOGICAL - normal gait, normal balance, normal motor, no ataxia, alert, oriented and no focal signs  PSYCHIATRIC - alert, pleasant and cordial, age-appropriate    Assessment/Plan   Healthcare maintenance  UTD on all vaccines  Fasting blood work ordered, please get this done at your earliest convenience  Best of luck on your surgery tomorrow.    2. Migraines  Stable, sumatriptan refills sent    3. Presurgical Clearance  Doing well, cleared for surgery tomorrow.  Left hip arthroscopy with rim trim, femoral chondroplasty, labral repair to be done tomorrow.    4.  Left shoulder pain  I recommend you continue following up with exercising at home  Also I recommend you discuss this with your orthopedic surgeon, Dr. Lynn, as he specializes in shoulders as well    5.  Right ankle skin lesion  I did provide Lotrisone cream to see if this would help  Ultimately if this continues to be an issue, I also provided information for [facility]    6.  Low back pain  I truly believe that this is only due to an overuse issue  I recommend plenty of water as well as NSAID/Tylenol  Please discuss this further with your orthopedic surgeon

## 2025-01-23 NOTE — DISCHARGE INSTRUCTIONS
Hip Arthroscopy Postoperative Instructions       Diet  Begin with clear liquids and light foods (jello, soup, etc)  Progress to your normal diet if you are not nauseated    Wound Care  Maintain your operative dressing, loosen bandage if swelling occurs  It is normal to have some bleeding or oozing from the surgical sites due to fluid irrigation during the surgery.  Please change the dressing as needed.  Removal surgical dressings (including yellow gauze if present)  on the third post-operative day. If minimal drainage is present, apply bandaids over incisions and change daily.  Do not apply bacitracin or other ointments to the incisions.  You can remove ROSLYN hose (long white socks) on the third post operative day  To avoid infection, keep incisions clean and dry.  You can shower 2 days post op.  MUST KEEP THE INCISIONS DRY.  NO immersion of the leg into a bath/pool etc.    Ice Therapy  Begin immediately after surgery  Use ice machine continuously or ice packs every 2 hours for 20 minutes daily.  Do not place the wrap or ice packs directly onto skin.     Activity  Elevate the operative leg to chest level whenever possible to decrease swelling  Do not place pillows under the knee, but rather place a pillow under the foot/ankle if needed  Use Crutches for ambulation.  Weight bearing will be determined by the procedure  Avoid long periods of sitting without the leg elevated or long distance travel for 2 weeks  No driving until discussed at your first post-operative appointment  May return to sedentary work or school once you have discontinued narcotic pain medication    Brace  Your brace should be worn at all times but can be removed for hygiene and physical therapy     Exercise  Do ankle pumps continuously throughout the day to reduce the possibility of a blood clot in your calf  Formal physical therapy will begin post-operative day #1      Medications  Pain medication is injected in the wound and hip during surgery.   This will wear off within 8-12 hours.   You may have received a nerve block prior to surgery. If so, this will wear off within 24-36 hours.  Most patients require narcotic pain medication for a short period of time after surgery.  Common side effects include nausea, drowsiness and constipation.  To decrease side effects take these medications with food. If constipation occurs, you can utilize over the counter Colace or Miralax.  If you are having problems with nausea and vomiting, contact the office to discuss.  Do not drive a car or operate machinery while taking narcotic pain medication.  The following medications are enclosed to use post-operatively  Percocet (Oxycodone with Acetaminophen) for pain control  Indocin (Indomethacin) for heterotopic bone prophylaxis.  **MAKE SURE THIS MEDICATION IS FILLED AND TAKEN AS DIRECTED**  Baby aspirin twice a day to help reduce the risk of a blood clot  Zofran (Ondansetron) as needed for nausea  Robaxin (Methocarbamol) as needed for spasms should they occur  Naproxen to take as needed for pain after you complete the Indocin    Contact information  Our office phone is [phone].  Contact the office with any of the following  Painful swelling or numbness  Unrelenting pain  Fever over 101° or chills  Redness around incision  Continuous drainage or bleeding from the incision. A small amount is to be expected)  Color change in the leg  Trouble breathing  Excessive nausea or vomiting  If you have an emergency after hours on the weekend, please call [phone] to reach the answering service who will contact Dr. Lynn  If you have an emergency that requires immediate attention, proceed to the nearest emergency room or call [phone].      Follow up  Your first post operative appointment should be scheduled around 14 days after surgery. Contact the office at [phone] if this has not yet been set up.

## 2025-01-24 ENCOUNTER — APPOINTMENT (OUTPATIENT)
Dept: RADIOLOGY | Facility: HOSPITAL | Age: 25
End: 2025-01-24
Payer: COMMERCIAL

## 2025-01-24 ENCOUNTER — APPOINTMENT (OUTPATIENT)
Dept: ORTHOPEDIC SURGERY | Facility: CLINIC | Age: 25
End: 2025-01-24
Payer: COMMERCIAL

## 2025-01-24 ENCOUNTER — HOSPITAL ENCOUNTER (OUTPATIENT)
Facility: HOSPITAL | Age: 25
Setting detail: OUTPATIENT SURGERY
Discharge: HOME | End: 2025-01-24
Attending: ORTHOPAEDIC SURGERY | Admitting: ORTHOPAEDIC SURGERY
Payer: COMMERCIAL

## 2025-01-24 ENCOUNTER — ANESTHESIA (OUTPATIENT)
Dept: OPERATING ROOM | Facility: HOSPITAL | Age: 25
End: 2025-01-24
Payer: COMMERCIAL

## 2025-01-24 VITALS
BODY MASS INDEX: 19.11 KG/M2 | WEIGHT: 126.1 LBS | SYSTOLIC BLOOD PRESSURE: 117 MMHG | HEIGHT: 68 IN | DIASTOLIC BLOOD PRESSURE: 61 MMHG | RESPIRATION RATE: 17 BRPM | OXYGEN SATURATION: 98 % | TEMPERATURE: 97.5 F | HEART RATE: 66 BPM

## 2025-01-24 DIAGNOSIS — M24.052 LOOSE BODY IN LEFT HIP: Primary | ICD-10-CM

## 2025-01-24 DIAGNOSIS — S73.192A TEAR OF LEFT ACETABULAR LABRUM, INITIAL ENCOUNTER: ICD-10-CM

## 2025-01-24 DIAGNOSIS — M25.852 FEMOROACETABULAR IMPINGEMENT OF LEFT HIP: ICD-10-CM

## 2025-01-24 PROCEDURE — A4649 SURGICAL SUPPLIES: HCPCS | Performed by: ORTHOPAEDIC SURGERY

## 2025-01-24 PROCEDURE — 7100000010 HC PHASE TWO TIME - EACH INCREMENTAL 1 MINUTE: Performed by: ORTHOPAEDIC SURGERY

## 2025-01-24 PROCEDURE — 7100000001 HC RECOVERY ROOM TIME - INITIAL BASE CHARGE: Performed by: ORTHOPAEDIC SURGERY

## 2025-01-24 PROCEDURE — C1713 ANCHOR/SCREW BN/BN,TIS/BN: HCPCS | Performed by: ORTHOPAEDIC SURGERY

## 2025-01-24 PROCEDURE — 2500000004 HC RX 250 GENERAL PHARMACY W/ HCPCS (ALT 636 FOR OP/ED): Mod: JZ | Performed by: ORTHOPAEDIC SURGERY

## 2025-01-24 PROCEDURE — 29916 HIP ARTHRO W/LABRAL REPAIR: CPT | Performed by: ORTHOPAEDIC SURGERY

## 2025-01-24 PROCEDURE — 2500000001 HC RX 250 WO HCPCS SELF ADMINISTERED DRUGS (ALT 637 FOR MEDICARE OP): Performed by: STUDENT IN AN ORGANIZED HEALTH CARE EDUCATION/TRAINING PROGRAM

## 2025-01-24 PROCEDURE — 3700000002 HC GENERAL ANESTHESIA TIME - EACH INCREMENTAL 1 MINUTE: Performed by: ORTHOPAEDIC SURGERY

## 2025-01-24 PROCEDURE — 3600000004 HC OR TIME - INITIAL BASE CHARGE - PROCEDURE LEVEL FOUR: Performed by: ORTHOPAEDIC SURGERY

## 2025-01-24 PROCEDURE — 7100000002 HC RECOVERY ROOM TIME - EACH INCREMENTAL 1 MINUTE: Performed by: ORTHOPAEDIC SURGERY

## 2025-01-24 PROCEDURE — 29999 UNLISTED PX ARTHROSCOPY: CPT | Performed by: ORTHOPAEDIC SURGERY

## 2025-01-24 PROCEDURE — 29914 HIP ARTHRO W/FEMOROPLASTY: CPT | Performed by: ORTHOPAEDIC SURGERY

## 2025-01-24 PROCEDURE — 2500000004 HC RX 250 GENERAL PHARMACY W/ HCPCS (ALT 636 FOR OP/ED): Mod: JZ | Performed by: STUDENT IN AN ORGANIZED HEALTH CARE EDUCATION/TRAINING PROGRAM

## 2025-01-24 PROCEDURE — 2780000003 HC OR 278 NO HCPCS: Performed by: ORTHOPAEDIC SURGERY

## 2025-01-24 PROCEDURE — 76000 FLUOROSCOPY <1 HR PHYS/QHP: CPT | Mod: LT

## 2025-01-24 PROCEDURE — 7100000009 HC PHASE TWO TIME - INITIAL BASE CHARGE: Performed by: ORTHOPAEDIC SURGERY

## 2025-01-24 PROCEDURE — 3600000009 HC OR TIME - EACH INCREMENTAL 1 MINUTE - PROCEDURE LEVEL FOUR: Performed by: ORTHOPAEDIC SURGERY

## 2025-01-24 PROCEDURE — 2500000005 HC RX 250 GENERAL PHARMACY W/O HCPCS

## 2025-01-24 PROCEDURE — 2500000004 HC RX 250 GENERAL PHARMACY W/ HCPCS (ALT 636 FOR OP/ED): Performed by: ANESTHESIOLOGIST ASSISTANT

## 2025-01-24 PROCEDURE — 29861 HIP ARTHRO W/FB REMOVAL: CPT | Performed by: ORTHOPAEDIC SURGERY

## 2025-01-24 PROCEDURE — 2500000001 HC RX 250 WO HCPCS SELF ADMINISTERED DRUGS (ALT 637 FOR MEDICARE OP): Performed by: SPECIALIST/TECHNOLOGIST

## 2025-01-24 PROCEDURE — 2720000007 HC OR 272 NO HCPCS: Performed by: ORTHOPAEDIC SURGERY

## 2025-01-24 PROCEDURE — 2500000004 HC RX 250 GENERAL PHARMACY W/ HCPCS (ALT 636 FOR OP/ED)

## 2025-01-24 PROCEDURE — 3700000001 HC GENERAL ANESTHESIA TIME - INITIAL BASE CHARGE: Performed by: ORTHOPAEDIC SURGERY

## 2025-01-24 DEVICE — NANOTACK TT SUTURE ANCHOR, 1.4MM WITH 1.2MM XBRAID TT
Type: IMPLANTABLE DEVICE | Site: HIP | Status: FUNCTIONAL
Brand: NANOTACK

## 2025-01-24 RX ORDER — ONDANSETRON 4 MG/1
4 TABLET, ORALLY DISINTEGRATING ORAL EVERY 8 HOURS PRN
Qty: 30 TABLET | Refills: 0 | Status: SHIPPED | OUTPATIENT
Start: 2025-01-24 | End: 2025-02-03

## 2025-01-24 RX ORDER — ACETAMINOPHEN 325 MG/1
975 TABLET ORAL ONCE
Status: COMPLETED | OUTPATIENT
Start: 2025-01-24 | End: 2025-01-24

## 2025-01-24 RX ORDER — DEXMEDETOMIDINE HYDROCHLORIDE 100 UG/ML
INJECTION, SOLUTION INTRAVENOUS AS NEEDED
Status: DISCONTINUED | OUTPATIENT
Start: 2025-01-24 | End: 2025-01-24

## 2025-01-24 RX ORDER — ROCURONIUM BROMIDE 10 MG/ML
INJECTION, SOLUTION INTRAVENOUS AS NEEDED
Status: DISCONTINUED | OUTPATIENT
Start: 2025-01-24 | End: 2025-01-24

## 2025-01-24 RX ORDER — METHOCARBAMOL 100 MG/ML
INJECTION, SOLUTION INTRAMUSCULAR; INTRAVENOUS AS NEEDED
Status: DISCONTINUED | OUTPATIENT
Start: 2025-01-24 | End: 2025-01-24

## 2025-01-24 RX ORDER — LIDOCAINE HYDROCHLORIDE 20 MG/ML
INJECTION, SOLUTION INFILTRATION; PERINEURAL AS NEEDED
Status: DISCONTINUED | OUTPATIENT
Start: 2025-01-24 | End: 2025-01-24

## 2025-01-24 RX ORDER — BUPIVACAINE HYDROCHLORIDE 5 MG/ML
INJECTION, SOLUTION EPIDURAL; INTRACAUDAL AS NEEDED
Status: DISCONTINUED | OUTPATIENT
Start: 2025-01-24 | End: 2025-01-24 | Stop reason: HOSPADM

## 2025-01-24 RX ORDER — LABETALOL HYDROCHLORIDE 5 MG/ML
5 INJECTION, SOLUTION INTRAVENOUS ONCE AS NEEDED
Status: DISCONTINUED | OUTPATIENT
Start: 2025-01-24 | End: 2025-01-24 | Stop reason: HOSPADM

## 2025-01-24 RX ORDER — ENOXAPARIN SODIUM 100 MG/ML
40 INJECTION SUBCUTANEOUS DAILY
Qty: 14 EACH | Refills: 0 | Status: SHIPPED | OUTPATIENT
Start: 2025-01-24 | End: 2025-02-07

## 2025-01-24 RX ORDER — PROPOFOL 10 MG/ML
INJECTION, EMULSION INTRAVENOUS AS NEEDED
Status: DISCONTINUED | OUTPATIENT
Start: 2025-01-24 | End: 2025-01-24

## 2025-01-24 RX ORDER — OXYCODONE AND ACETAMINOPHEN 5; 325 MG/1; MG/1
1 TABLET ORAL EVERY 6 HOURS PRN
Qty: 20 TABLET | Refills: 0 | Status: SHIPPED | OUTPATIENT
Start: 2025-01-24 | End: 2025-01-29

## 2025-01-24 RX ORDER — SODIUM CHLORIDE 450 MG/100ML
INJECTION, SOLUTION INTRAVENOUS CONTINUOUS PRN
Status: DISCONTINUED | OUTPATIENT
Start: 2025-01-24 | End: 2025-01-24

## 2025-01-24 RX ORDER — SODIUM CHLORIDE, SODIUM LACTATE, POTASSIUM CHLORIDE, AND CALCIUM CHLORIDE .6; .31; .03; .02 G/100ML; G/100ML; G/100ML; G/100ML
IRRIGANT IRRIGATION AS NEEDED
Status: DISCONTINUED | OUTPATIENT
Start: 2025-01-24 | End: 2025-01-24 | Stop reason: HOSPADM

## 2025-01-24 RX ORDER — PHENYLEPHRINE HCL IN 0.9% NACL 1 MG/10 ML
SYRINGE (ML) INTRAVENOUS AS NEEDED
Status: DISCONTINUED | OUTPATIENT
Start: 2025-01-24 | End: 2025-01-24

## 2025-01-24 RX ORDER — DIPHENHYDRAMINE HYDROCHLORIDE 50 MG/ML
12.5 INJECTION INTRAMUSCULAR; INTRAVENOUS ONCE AS NEEDED
Status: DISCONTINUED | OUTPATIENT
Start: 2025-01-24 | End: 2025-01-24 | Stop reason: HOSPADM

## 2025-01-24 RX ORDER — CEFAZOLIN 1 G/1
INJECTION, POWDER, FOR SOLUTION INTRAVENOUS AS NEEDED
Status: DISCONTINUED | OUTPATIENT
Start: 2025-01-24 | End: 2025-01-24

## 2025-01-24 RX ORDER — FENTANYL CITRATE 50 UG/ML
INJECTION, SOLUTION INTRAMUSCULAR; INTRAVENOUS AS NEEDED
Status: DISCONTINUED | OUTPATIENT
Start: 2025-01-24 | End: 2025-01-24

## 2025-01-24 RX ORDER — MORPHINE SULFATE 0.5 MG/ML
INJECTION, SOLUTION EPIDURAL; INTRATHECAL; INTRAVENOUS AS NEEDED
Status: DISCONTINUED | OUTPATIENT
Start: 2025-01-24 | End: 2025-01-24 | Stop reason: HOSPADM

## 2025-01-24 RX ORDER — ONDANSETRON HYDROCHLORIDE 2 MG/ML
4 INJECTION, SOLUTION INTRAVENOUS ONCE AS NEEDED
Status: DISCONTINUED | OUTPATIENT
Start: 2025-01-24 | End: 2025-01-24 | Stop reason: HOSPADM

## 2025-01-24 RX ORDER — NORETHINDRONE AND ETHINYL ESTRADIOL 0.5-0.035
KIT ORAL AS NEEDED
Status: DISCONTINUED | OUTPATIENT
Start: 2025-01-24 | End: 2025-01-24

## 2025-01-24 RX ORDER — METHOCARBAMOL 750 MG/1
750 TABLET, FILM COATED ORAL 3 TIMES DAILY
Qty: 30 TABLET | Refills: 0 | Status: SHIPPED | OUTPATIENT
Start: 2025-01-24 | End: 2025-02-03

## 2025-01-24 RX ORDER — PROMETHAZINE HYDROCHLORIDE 25 MG/ML
6.25 INJECTION, SOLUTION INTRAMUSCULAR; INTRAVENOUS ONCE AS NEEDED
Status: DISCONTINUED | OUTPATIENT
Start: 2025-01-24 | End: 2025-01-24 | Stop reason: HOSPADM

## 2025-01-24 RX ORDER — HYDROMORPHONE HYDROCHLORIDE 1 MG/ML
INJECTION, SOLUTION INTRAMUSCULAR; INTRAVENOUS; SUBCUTANEOUS AS NEEDED
Status: DISCONTINUED | OUTPATIENT
Start: 2025-01-24 | End: 2025-01-24

## 2025-01-24 RX ORDER — LIDOCAINE HYDROCHLORIDE 10 MG/ML
0.1 INJECTION, SOLUTION EPIDURAL; INFILTRATION; INTRACAUDAL; PERINEURAL ONCE
Status: DISCONTINUED | OUTPATIENT
Start: 2025-01-24 | End: 2025-01-24 | Stop reason: HOSPADM

## 2025-01-24 RX ORDER — ONDANSETRON HYDROCHLORIDE 2 MG/ML
INJECTION, SOLUTION INTRAVENOUS AS NEEDED
Status: DISCONTINUED | OUTPATIENT
Start: 2025-01-24 | End: 2025-01-24

## 2025-01-24 RX ORDER — SODIUM CHLORIDE, SODIUM LACTATE, POTASSIUM CHLORIDE, CALCIUM CHLORIDE 600; 310; 30; 20 MG/100ML; MG/100ML; MG/100ML; MG/100ML
100 INJECTION, SOLUTION INTRAVENOUS CONTINUOUS
Status: DISCONTINUED | OUTPATIENT
Start: 2025-01-24 | End: 2025-01-24 | Stop reason: HOSPADM

## 2025-01-24 RX ORDER — NAPROXEN SODIUM 220 MG/1
81 TABLET, FILM COATED ORAL 2 TIMES DAILY
Qty: 28 TABLET | Refills: 0 | Status: SHIPPED | OUTPATIENT
Start: 2025-01-25 | End: 2025-01-24 | Stop reason: HOSPADM

## 2025-01-24 RX ORDER — OXYCODONE HYDROCHLORIDE 5 MG/1
5 TABLET ORAL EVERY 4 HOURS PRN
Status: DISCONTINUED | OUTPATIENT
Start: 2025-01-24 | End: 2025-01-24 | Stop reason: HOSPADM

## 2025-01-24 RX ORDER — DOCUSATE SODIUM 100 MG/1
100 CAPSULE, LIQUID FILLED ORAL 2 TIMES DAILY
Qty: 30 CAPSULE | Refills: 0 | Status: SHIPPED | OUTPATIENT
Start: 2025-01-24 | End: 2025-02-08

## 2025-01-24 RX ORDER — GABAPENTIN 300 MG/1
600 CAPSULE ORAL ONCE
Status: COMPLETED | OUTPATIENT
Start: 2025-01-24 | End: 2025-01-24

## 2025-01-24 RX ORDER — MIDAZOLAM HYDROCHLORIDE 1 MG/ML
INJECTION INTRAMUSCULAR; INTRAVENOUS AS NEEDED
Status: DISCONTINUED | OUTPATIENT
Start: 2025-01-24 | End: 2025-01-24

## 2025-01-24 RX ADMIN — FENTANYL CITRATE 100 MCG: 50 INJECTION, SOLUTION INTRAMUSCULAR; INTRAVENOUS at 13:32

## 2025-01-24 RX ADMIN — MIDAZOLAM HYDROCHLORIDE 2 MG: 1 INJECTION, SOLUTION INTRAMUSCULAR; INTRAVENOUS at 13:10

## 2025-01-24 RX ADMIN — METHOCARBAMOL 500 MG: 1000 INJECTION, SOLUTION INTRAMUSCULAR; INTRAVENOUS at 15:34

## 2025-01-24 RX ADMIN — Medication 100 MCG: at 14:13

## 2025-01-24 RX ADMIN — HYDROMORPHONE HYDROCHLORIDE 1 MG: 1 INJECTION, SOLUTION INTRAMUSCULAR; INTRAVENOUS; SUBCUTANEOUS at 16:31

## 2025-01-24 RX ADMIN — LIDOCAINE HYDROCHLORIDE 90 MG: 20 INJECTION, SOLUTION INFILTRATION; PERINEURAL at 13:32

## 2025-01-24 RX ADMIN — ACETAMINOPHEN 975 MG: 325 TABLET, FILM COATED ORAL at 11:49

## 2025-01-24 RX ADMIN — DEXMEDETOMIDINE 20 MCG: 100 INJECTION, SOLUTION INTRAVENOUS at 16:35

## 2025-01-24 RX ADMIN — ROCURONIUM BROMIDE 30 MG: 10 INJECTION, SOLUTION INTRAVENOUS at 15:22

## 2025-01-24 RX ADMIN — SUGAMMADEX 200 MG: 100 INJECTION, SOLUTION INTRAVENOUS at 16:13

## 2025-01-24 RX ADMIN — HYDROMORPHONE HYDROCHLORIDE 0.5 MG: 1 INJECTION, SOLUTION INTRAMUSCULAR; INTRAVENOUS; SUBCUTANEOUS at 17:59

## 2025-01-24 RX ADMIN — OXYCODONE HYDROCHLORIDE 5 MG: 5 TABLET ORAL at 18:12

## 2025-01-24 RX ADMIN — PROPOFOL 190 MG: 10 INJECTION, EMULSION INTRAVENOUS at 13:32

## 2025-01-24 RX ADMIN — FENTANYL CITRATE 100 MCG: 50 INJECTION, SOLUTION INTRAMUSCULAR; INTRAVENOUS at 13:10

## 2025-01-24 RX ADMIN — EPHEDRINE SULFATE 10 MG: 50 INJECTION, SOLUTION INTRAVENOUS at 14:26

## 2025-01-24 RX ADMIN — PROPOFOL 100 MG: 10 INJECTION, EMULSION INTRAVENOUS at 16:11

## 2025-01-24 RX ADMIN — METHOCARBAMOL 500 MG: 1000 INJECTION, SOLUTION INTRAMUSCULAR; INTRAVENOUS at 15:46

## 2025-01-24 RX ADMIN — GABAPENTIN 600 MG: 300 CAPSULE ORAL at 11:49

## 2025-01-24 RX ADMIN — PROPOFOL 10 MG: 10 INJECTION, EMULSION INTRAVENOUS at 14:46

## 2025-01-24 RX ADMIN — CEFAZOLIN 2 G: 1 INJECTION, POWDER, FOR SOLUTION INTRAMUSCULAR; INTRAVENOUS at 13:41

## 2025-01-24 RX ADMIN — ROCURONIUM BROMIDE 10 MG: 10 INJECTION, SOLUTION INTRAVENOUS at 14:10

## 2025-01-24 RX ADMIN — Medication 100 MCG: at 14:17

## 2025-01-24 RX ADMIN — HYDROMORPHONE HYDROCHLORIDE 0.5 MG: 1 INJECTION, SOLUTION INTRAMUSCULAR; INTRAVENOUS; SUBCUTANEOUS at 18:22

## 2025-01-24 RX ADMIN — MIDAZOLAM HYDROCHLORIDE 2 MG: 1 INJECTION, SOLUTION INTRAMUSCULAR; INTRAVENOUS at 13:17

## 2025-01-24 RX ADMIN — DEXAMETHASONE SODIUM PHOSPHATE 4 MG: 4 INJECTION, SOLUTION INTRAMUSCULAR; INTRAVENOUS at 15:55

## 2025-01-24 RX ADMIN — ONDANSETRON 4 MG: 2 INJECTION, SOLUTION INTRAMUSCULAR; INTRAVENOUS at 15:55

## 2025-01-24 RX ADMIN — SODIUM CHLORIDE: 9 INJECTION, SOLUTION INTRAVENOUS at 13:22

## 2025-01-24 RX ADMIN — ROCURONIUM BROMIDE 50 MG: 10 INJECTION, SOLUTION INTRAVENOUS at 13:32

## 2025-01-24 RX ADMIN — ROCURONIUM BROMIDE 10 MG: 10 INJECTION, SOLUTION INTRAVENOUS at 14:33

## 2025-01-24 RX ADMIN — HYDROMORPHONE HYDROCHLORIDE 0.2 MG: 1 INJECTION, SOLUTION INTRAMUSCULAR; INTRAVENOUS; SUBCUTANEOUS at 17:50

## 2025-01-24 ASSESSMENT — PAIN SCALES - GENERAL
PAINLEVEL_OUTOF10: 7
PAINLEVEL_OUTOF10: 4
PAINLEVEL_OUTOF10: 7
PAINLEVEL_OUTOF10: 6
PAINLEVEL_OUTOF10: 5 - MODERATE PAIN
PAINLEVEL_OUTOF10: 5 - MODERATE PAIN
PAINLEVEL_OUTOF10: 6
PAINLEVEL_OUTOF10: 5 - MODERATE PAIN
PAINLEVEL_OUTOF10: 5 - MODERATE PAIN

## 2025-01-24 ASSESSMENT — PAIN DESCRIPTION - LOCATION: LOCATION: HIP

## 2025-01-24 ASSESSMENT — PAIN - FUNCTIONAL ASSESSMENT
PAIN_FUNCTIONAL_ASSESSMENT: 0-10
PAIN_FUNCTIONAL_ASSESSMENT: UNABLE TO SELF-REPORT
PAIN_FUNCTIONAL_ASSESSMENT: 0-10
PAIN_FUNCTIONAL_ASSESSMENT: UNABLE TO SELF-REPORT
PAIN_FUNCTIONAL_ASSESSMENT: 0-10
PAIN_FUNCTIONAL_ASSESSMENT: UNABLE TO SELF-REPORT
PAIN_FUNCTIONAL_ASSESSMENT: 0-10
PAIN_FUNCTIONAL_ASSESSMENT: 0-10
PAIN_FUNCTIONAL_ASSESSMENT: UNABLE TO SELF-REPORT

## 2025-01-24 ASSESSMENT — COLUMBIA-SUICIDE SEVERITY RATING SCALE - C-SSRS
1. IN THE PAST MONTH, HAVE YOU WISHED YOU WERE DEAD OR WISHED YOU COULD GO TO SLEEP AND NOT WAKE UP?: NO
2. HAVE YOU ACTUALLY HAD ANY THOUGHTS OF KILLING YOURSELF?: NO
6. HAVE YOU EVER DONE ANYTHING, STARTED TO DO ANYTHING, OR PREPARED TO DO ANYTHING TO END YOUR LIFE?: NO

## 2025-01-24 ASSESSMENT — PAIN DESCRIPTION - DESCRIPTORS: DESCRIPTORS: ACHING

## 2025-01-24 ASSESSMENT — PAIN DESCRIPTION - ORIENTATION: ORIENTATION: LEFT

## 2025-01-24 NOTE — H&P
History Of Present Illness  Mina Leavitt is a 24 y.o. male presenting with 1 year of left hip pain of insidious onset.  Pain is located mainly in his groin worsened with sitting, cycling and exercising.  He has performed formal physical therapy, modified his activities and used over-the-counter anti-inflammatory medications without adequately relieving his symptoms.  He was seen by Dr. Moyer in [place], where he works, who recommended a referral for a potential combined left hip scope and periacetabular osteotomy.     Past Medical History  He has a past medical history of Migraine, Personal history of other diseases of the musculoskeletal system and connective tissue (10/24/2018), and Seasonal allergies.    Surgical History  He has no past surgical history on file.     Social History  He reports that he has never smoked. He has never been exposed to tobacco smoke. He has never used smokeless tobacco. He reports that he does not currently use alcohol. He reports that he does not use drugs.    Family History  No family history on file.     Allergies  Ibuprofen, Nsaids (non-steroidal anti-inflammatory drug), Grass pollen, and Pollen extracts    Review of Systems  Negative unless stated in the HPI  Physical Exam   There is not pain with a resisted situp.    There is not abdominal distention or tenderness     The patient's range of motion reveals that they have 110° of hip flexion on the affected side.   ER 35 degrees.   IR 5 degrees  Hip extension to 10°   Abduction to 45°        The patient is 5 out of 5 strength with resisted hip AB, adduction, hamstring and quadriceps testing.     No pain over the hip flexor, ASIS.  No pain over the proximal hamstring, piriformis        Pain Provacation testing:     Positive impingement sign,with a painful arc from 12 to 3:00.  Negative Psoas impingement/Fermin test  Negative instability, Log roll.  Positive subspine impingement test, which is pain with straight hip  "flexion.     Negative straight leg raise.  Negative circumduction clunk.        Peritrochanteric space examination:  Tenderness over the adam-trochanteric space - No  pain over the posterior trochanter - No  Last Recorded Vitals  Height 1.727 m (5' 8\"), weight 57.2 kg (126 lb 1.7 oz).    Relevant Results      X-rays reviewed reveal no gross fracture or dislocation.  Left hip: Center edge angle measures 21 degrees.  Acetabular index measures 4 degrees.  Alpha angle measures 75 degrees.  Mild retroversion.  Inferior osteophyte with early degenerative changes.     MRI reviewed reveals anterior labral tear with early stage diffuse chondral thinning no full-thickness defect and a cartilaginous loose body.    Scheduled medications  acetaminophen, 975 mg, oral, Once  gabapentin, 600 mg, oral, Once      Continuous medications     PRN medications    No results found for this or any previous visit (from the past 24 hours).    Assessment/Plan   Assessment & Plan  Tear of left acetabular labrum    Femoroacetabular impingement of left hip    Loose body in left hip      We discussed his clinical presentation and physical exam findings consistent with left hip femoral acetabular impingement and hip dysplasia.  He has failed conservative management such as formal physical therapy, modifying his activities and use of anti-inflammatory medications.  He is unable to exercise.  He desires to remain active.  He has some hip dysplasia on radiographs with a significant cam morphology.  He wishes to proceed with an isolated left hip arthroscopy.  Risk and benefits of the procedure were discussed at length with the patient and he wishes to proceed.             Ki Levy PA-C    "

## 2025-01-24 NOTE — ANESTHESIA PREPROCEDURE EVALUATION
Patient: Mina Leavitt    Procedure Information       Date/Time: 01/24/25 3625    Procedure: Left Hip Arthroscopy; Labral Repair; Rim Trim; Osteoplasty; Capsular Plication (Left: Hip)    Location: [facility] A OR 18 / Virtual [facility] A OR    Surgeons: Jose Luis Lynn MD            Relevant Problems   Skin   (+) Eczema       Clinical information reviewed:   Tobacco  Allergies  Meds   Med Hx  Surg Hx   Fam Hx  Soc Hx        NPO Detail:  No data recorded     Physical Exam    Airway  Mallampati: II  TM distance: >3 FB  Neck ROM: full     Cardiovascular   Rhythm: regular  Rate: normal     Dental    Pulmonary   Breath sounds clear to auscultation     Abdominal            Anesthesia Plan    History of general anesthesia?: yes  History of complications of general anesthesia?: no    ASA 2     general     intravenous induction   Anesthetic plan and risks discussed with patient.    Plan discussed with CRNA.

## 2025-01-24 NOTE — ANESTHESIA POSTPROCEDURE EVALUATION
Patient: Mina Leavitt    Procedure Summary       Date: 01/24/25 Room / Location: [facility] 18 / [facility]    Anesthesia Start: 1317 Anesthesia Stop: 1636    Procedure: Left Hip Arthroscopy; Labral Repair; Rim Trim; Osteoplasty; Capsular Plication (Left: Hip) Diagnosis:       Tear of left acetabular labrum, initial encounter      Femoroacetabular impingement of left hip      Loose body in left hip      (Tear of left acetabular labrum, initial encounter [S73.192A])      (Femoroacetabular impingement of left hip [M25.852])      (Loose body in left hip [M24.052])    Surgeons: Jose Luis Lynn MD Responsible Provider: Ki Davis MD    Anesthesia Type: general ASA Status: 2            Anesthesia Type: general    Vitals Value Taken Time   /76 01/24/25 1818   Temp 36.4 °C (97.5 °F) 01/24/25 1715   Pulse 72 01/24/25 1824   Resp 14 01/24/25 1815   SpO2 100 % 01/24/25 1824   Vitals shown include unfiled device data.    Anesthesia Post Evaluation    Patient location during evaluation: bedside  Patient participation: complete - patient participated  Level of consciousness: awake  Pain management: adequate  Multimodal analgesia pain management approach  Airway patency: patent  Cardiovascular status: stable  Respiratory status: spontaneous ventilation and unassisted  Hydration status: acceptable  Postoperative Nausea and Vomiting: none  Comments: No significant PONV.        No notable events documented.

## 2025-01-24 NOTE — ANESTHESIA PROCEDURE NOTES
Airway  Date/Time: 1/24/2025 1:36 PM  Urgency: elective    Airway not difficult    Staffing  Performed: [facility]   Authorized by: Ki Davis MD    Performed by: Ki Hill RN  Patient location during procedure: OR    Indications and Patient Condition  Indications for airway management: anesthesia and airway protection  Spontaneous ventilation: present  Sedation level: deep  Preoxygenated: yes  Patient position: sniffing  Mask difficulty assessment: 1 - vent by mask  Planned trial extubation    Final Airway Details  Final airway type: endotracheal airway      Successful airway: ETT  Cuffed: yes   Successful intubation technique: direct laryngoscopy  Facilitating devices/methods: intubating stylet  Blade: Stephane  Blade size: #3  ETT size (mm): 7.0  Cormack-Lehane Classification: grade IIb - view of arytenoids or posterior of glottis only  Placement verified by: capnometry   Cuff volume (mL): 10  Measured from: lips  ETT to lips (cm): 22  Number of attempts at approach: 1    Additional Comments  Atraumatic intubation, short thyromental distance (2.5 finger breadths), DL times one, cords visualized after headlift.

## 2025-01-27 ENCOUNTER — EVALUATION (OUTPATIENT)
Dept: PHYSICAL THERAPY | Facility: CLINIC | Age: 25
End: 2025-01-27
Payer: COMMERCIAL

## 2025-01-27 DIAGNOSIS — S73.192D ACETABULAR LABRUM TEAR, LEFT, SUBSEQUENT ENCOUNTER: Primary | ICD-10-CM

## 2025-01-27 DIAGNOSIS — S73.192D TEAR OF LEFT ACETABULAR LABRUM, SUBSEQUENT ENCOUNTER: ICD-10-CM

## 2025-01-27 PROCEDURE — 97140 MANUAL THERAPY 1/> REGIONS: CPT | Mod: GP | Performed by: PHYSICAL THERAPIST

## 2025-01-27 PROCEDURE — 97110 THERAPEUTIC EXERCISES: CPT | Mod: GP | Performed by: PHYSICAL THERAPIST

## 2025-01-27 PROCEDURE — 97161 PT EVAL LOW COMPLEX 20 MIN: CPT | Mod: GP | Performed by: PHYSICAL THERAPIST

## 2025-01-27 ASSESSMENT — ENCOUNTER SYMPTOMS
DEPRESSION: 0
LOSS OF SENSATION IN FEET: 0
OCCASIONAL FEELINGS OF UNSTEADINESS: 0

## 2025-01-27 ASSESSMENT — PAIN - FUNCTIONAL ASSESSMENT: PAIN_FUNCTIONAL_ASSESSMENT: 0-10

## 2025-01-27 ASSESSMENT — PAIN SCALES - GENERAL: PAINLEVEL_OUTOF10: 4

## 2025-01-27 NOTE — PROGRESS NOTES
Physical Therapy    Physical Therapy Lower Extremity Evaluation    Patient Name: Mina Leavitt  MRN: [id number]  : 2000   Today's Date: 2025  Time Calculation  Start Time: 1040  Stop Time: 1145  Time Calculation (min): 65 min  PT Evaluation Time Entry  PT Evaluation (Low) Time Entry: 40  PT Therapeutic Procedures Time Entry  Manual Therapy Time Entry: 10  Therapeutic Exercise Time Entry: 15             Visit: 1  Auth: 25 visits. Auth not required    Current Problem  Problem List Items Addressed This Visit             ICD-10-CM    Acetabular labrum tear, left, subsequent encounter - Primary S73.192D        General  Name and  confirmed with patient on this date.       Ambulatory Screening Summary     Screening  Frequency  Date Last Completed   Spiritual and Cultural Beliefs   Screening each visit or episode of care 2025   Falls Risk Screening every ambulatory visit 2025 10:37 AM   Pain Screening annually at primary care visit     Domestic Violence screening annually at primary care visit    Depression Screening annually in the primary care setting 2025   Suicide Risk Screening annually in the primary care setting 2025   Nutrition and Food Insecurity   Screening at least annually at primary care visit     Key Learner annually in the primary care setting 2025   Drug Screen  2025 11:44 AM   Alcohol Screen  2025 11:44 AM   Advance Directive         Precautions  Precautions  STEADI Fall Risk Score (The score of 4 or more indicates an increased risk of falling): 1  Precautions Comment: See Dr. Lynn's protocol       Pain  Pain Assessment: 0-10  0-10 (Numeric) Pain Score: 4  Pain Location: Hip  Pain Orientation: Left    SUBJECTIVE:   Chief complaint:  Pt is a 25 yo male who presents s/p left hip arthroscopic labral repair, rim trim, osteoplasty and capsular plication on 25. Pt reports his hip pain is 4/10. Doing pretty well with personal care activities and using  "crutches and brace. Using ice machine throughout day. Difficulty sleeping due to brace. On short term disability for work. Plans to RTW in 2-3 weeks.Pt lives and works in [place], [place], but is currently staying with his parents in [facility].  No paresthesia in left LE. Using CPM at home.    Current limitations: prolonged standing or walking  Home setup: Apartment in [place]. Lives on 2nd floor  Work:   Patients goal: Back to normal function and exercise    OBJECTIVE:      Lower Extremity PROM: WNL unless documented below:  ROM in Degrees  RIGHT LEFT   Hip Flexion  80   Hip Extension     Hip Abduction  20   Hip Adduction     Hip ER  10   Hip IR                             Gait mechanics: Pt is ambulating with two axillary crutches, brace and approximately 20-30% WB through left LE with decreased left hip extension  Neurological symptoms: none      Functional Outcome:        TREATMENT:  EXERCISES Date 1/27/25 Date Date Date    REPS REPS REPS REPS   PROM left hip 10 mins      No ER > 20 degrees until 2/15/25       Hip circles       STM left hip-rectus,quads,iliopsoas,glut med/min,piriformis,TFL,adductors Next visit             Crutch training, stair climbing, brace fitting completed             Isometrics-gluts,quad, HS, adduction,abduction 5\"H X 20 each      Supine log rolling educated      Supine bridging 20 X 5\"H      Pelvic tilts 20X      Quadruped rocking for hip flexion       Sustained psoas stretch-supine with 1-2 pillows under hips 1 pillow, 2 mins      NMES left quads with SAQ              Left leg treadmill walking focusing on hip extension              Bike Seat 7   5 mins                                                                                          HEP        Access Code: [email or web address]  URL: [email or web address]  Date: 01/27/2025  Prepared by: Gaye Sarabia    Exercises  - Supine Gluteal Sets  - 1-2 x daily - 7 x weekly - 20 reps - 5 hold  - Supine Quad Set  - 1 x " daily - 7 x weekly - 20 reps - 5 hold  - Supine Isometric Hamstring Set (Mirrored)  - 1-2 x daily - 7 x weekly - 20 reps - 5 hold  - Supine Straight Leg Hip Adduction and Quad Set with Ball  - 1-2 x daily - 7 x weekly - 20 reps - 5 hold  - Hooklying Isometric Clamshell  - 1-2 x daily - 7 x weekly - 20 reps - 5 hold  - Supine Posterior Pelvic Tilt  - 1 x daily - 7 x weekly - 20 reps - 5 hold  - Supine Bridge  - 1-2 x daily - 7 x weekly - 20 reps - 5 hold    ASSESSEMENT  Pt is a 24 y.o. referred to physical therapy for a dx of left hip labral repair by Jose Luis Lynn MD . Pt presents with decreased left hip ROM, decreased left LE strength,decreased proprioception and altered gait. This pt would benefit from a therapy program to restore prior level of function, reduce pain, increase AROM, increase strength, and improve gait and balance.     The physical therapy prognosis is good for the patient to achieve their goals.   The pt tolerated therapy treatment today well with no adverse effects.  Barriers to therapy include:  none    PLAN  The pt will be seen 2 days a week for 3 weeks then transfer of care to PT in [place], [place].    The pt has been educated about the risks and benefits of physical therapy and gives consent for treatment.     The patient will benefit from physical therapy treatment to include:  STM, JM, PROM/AAROM/AROM left hip, strengthening, proprioception and gait training.    Goals:  Educate patient regarding proper gait mechanics using crutches and 50% WB through left LE on level ground and stairs-1-2 visits  Manage scarring around portal sites and prevent ncgsknjrp-4-2 weeks  Progress weight bearing and gait training to wean patient off of crutches and avoid abnormal gait kizlvquw-2-9 weeks  Transfer care to PT in [place], [place] where patient lives-3-4 weeks

## 2025-01-29 ENCOUNTER — TREATMENT (OUTPATIENT)
Dept: PHYSICAL THERAPY | Facility: CLINIC | Age: 25
End: 2025-01-29
Payer: COMMERCIAL

## 2025-01-29 DIAGNOSIS — S73.192D ACETABULAR LABRUM TEAR, LEFT, SUBSEQUENT ENCOUNTER: Primary | ICD-10-CM

## 2025-01-29 DIAGNOSIS — S73.192D TEAR OF LEFT ACETABULAR LABRUM, SUBSEQUENT ENCOUNTER: ICD-10-CM

## 2025-01-29 PROCEDURE — 97140 MANUAL THERAPY 1/> REGIONS: CPT | Mod: GP | Performed by: PHYSICAL THERAPIST

## 2025-01-29 PROCEDURE — 97110 THERAPEUTIC EXERCISES: CPT | Mod: GP | Performed by: PHYSICAL THERAPIST

## 2025-01-29 ASSESSMENT — PAIN - FUNCTIONAL ASSESSMENT: PAIN_FUNCTIONAL_ASSESSMENT: 0-10

## 2025-01-29 ASSESSMENT — PAIN SCALES - GENERAL: PAINLEVEL_OUTOF10: 3

## 2025-01-29 NOTE — OP NOTE
Left Hip Arthroscopy; Labral Repair; Rim Trim; Osteoplasty; Capsular Plication (L) Operative Note     Date: 2025  OR Location: [facility] OR    Name: Mina Leavitt, : 2000, Age: 24 y.o., MRN: [id number], Sex: male    PREOPERATIVE DIAGNOSIS:   1. 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   2. Acetabular labral tear.   3. Intra-articular loose bodies, one of which required separate   cannula for its removal.   4. Mild hip instability noted on exam under anesthesia.       POSTOPERATIVE DIAGNOSIS:   1. 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   2. Acetabular labral tear.   3. Intra-articular loose bodies, one of which required separate   cannula for its removal.   4. Mild hip instability noted on exam under anesthesia.   5. Grade 3 changes superior acetabular rim from 12-3 o'clock      OPERATION/PROCEDURE:   1. left hip arthroscopy with arthroscopic rim trim subspinous   decompression as a separate and identifiable procedure for pincer and   subspinous impingement.   2. Acetabular labral repair, 3-anchor looped configuration for a tear   extending from the 12 to 3 o'clock position.   3. Intra-articular loose body removal.   4. Femoral osteochondroplasty for CAM lesion.   5. Capsular plication.       SURGEON:   Jose Luis Lynn MD.       ASSISTANT(S):   First assistant; Syed Levy PA-C.  Please note that we will be   billing for my physician's assistant as he was critical and   necessary for successful completion of this case including limb   positioning, anchor placement, suture management, and wound closure.   There were no qualified residents available to assist      TRACTION TIME:   Less than 1 hour.       INDICATION FOR PROCEDURE:   Pleasant patient presented to my office with   persistent left-sided hip pain that had failed conservative   management.  Advanced imaging had revealed a labral tear in the   setting of mixed impingement.  Risks and benefits of surgery have    been discussed with the patient including, but not limited to,   bleeding, infection, damage to nerves or blood vessels, need for   further procedures, risks of anesthesia, blood clots, progression of   osteoarthritis, incomplete pain relief, heterotopic ossification,   pudendal nerve palsy, lateral femoral cutaneous nerve palsy,   avascular necrosis requiring hip replacement.  The patient understood   these risks and wished to proceed with the operative intervention.       PROCEDURE AND FINDINGS:   The patient was identified in the preoperative holding area.   Operative extremity was marked with an indelible marker.  Informed   consent was reviewed.  Patient was taken to the operating room where a   time-out was performed verifying correct site, side, procedure, and   our special equipment.   They were placed supine on the operating room   table.  All bony prominences were well padded.  Patient was then prepped   and draped in usual sterile fashion after anesthesia induced was   without difficulty.  Once the patient was prepped and draped, the hip   was distracted via postless technique.  Standard anterolateral   arthroscopy portal was created.  A modified mid anterior portal   created.  A capsulotomy was performed.  Combination of a shaver and   radiofrequency device used to clean off the superior acetabular rim   identifying the patient's pincer and subspinous impingement, this was   taken down with a bur in the standard fashion as a separate and   identifiable procedure.  We then placed 3 anchors; 1 at 12, 1 at 1, 1   at 3 o'clock; these were shuttled around the labrum, tied down with   alternating half hitches in a loop configuration.  We noted excellent   fixation of labrum.  A few intra-articular loose bodies were removed   Arthroscopically as they were encountered at the time of the operation   and a separate cannula was placed for their removal in order to facilitate  their removal due to size.  Head was  reduced.  We noted excellent resolution   of the patient's suction seal.  We identified and protected the   lateral retinacular vessels throughout the remainder of the case.  An   osteoplasty was then performed from the medial to lateral synovial   fold and proximally and distally to the extent of lesion.  Dynamic   examination revealed no residual impingement.  An x-ray showed good   sphericity on multiple views.  Given the patients laxity noted with distraction   on their exam under anesthesia we then performed a capsular plication   of the interportal capsulotomy with multiple sutures, tying these   down with alternating half hitches and clipping them with the knot.   We drained the hip, withdrew the arthroscope, closed the portals with   interrupted sutures, applied a sterile bandage.  The patient was   awoken from anesthesia without complication, transported to PACU in   stable condition.  Postoperative course will be standard hip   arthroscopy protocol.           Jose Luis Lynn MD

## 2025-01-29 NOTE — PROGRESS NOTES
Physical Therapy    Physical Therapy Treatment    Patient Name: Mina Leavitt  MRN: [id number]  : 2000   Today's Date: 2025  Time Calculation  Start Time: 1005  Stop Time: 1105  Time Calculation (min): 60 min     PT Therapeutic Procedures Time Entry  Manual Therapy Time Entry: 20  Therapeutic Exercise Time Entry: 40             Visit: 2  Auth: 25 visits. Auth not required    Current Problem  Problem List Items Addressed This Visit             ICD-10-CM    Acetabular labrum tear, left, subsequent encounter - Primary S73.192D    Relevant Orders    Follow Up In Physical Therapy        General  Name and  confirmed with patient on this date.           Precautions  Precautions  Precautions Comment: See Dr. Lynn's protocol       Pain  Pain Assessment: 0-10  0-10 (Numeric) Pain Score: 3  Pain Location: Hip  Pain Orientation: Left    SUBJECTIVE:   Pt reports that his walking is improving with 50% WB through left LE. Doing HEP 1-2X/day and has been biking 5 mins/day.      Current limitations: prolonged standing or walking  Home setup: Apartment in [place]. Lives on 2nd floor  Work:   Patients goal: Back to normal function and exercise    OBJECTIVE:      Lower Extremity PROM: WNL unless documented below:  ROM in Degrees  RIGHT LEFT   Hip Flexion  81   Hip Extension     Hip Abduction  20   Hip Adduction     Hip ER  10   Hip IR                             Gait mechanics: Pt is ambulating with two axillary crutches, brace and approximately 50% WB through left LE with improved left hip extension.      Functional Outcome:        TREATMENT:  EXERCISES Date 25 Date 25 Date Date    REPS REPS REPS REPS   PROM left hip 10 mins 10 mins     No ER > 20 degrees until 2/15/25       Hip circles  10X ea     STM left hip-rectus,quads,iliopsoas,glut med/min,piriformis,TFL,adductors Next visit 10 mins            Crutch training, stair climbing, brace fitting completed            "  Isometrics-gluts,quad, HS, adduction,abduction 5\"H X 20 each 5\"H X 20 each     Supine log rolling educated      Supine bridging 20 X 5\"H 20 X 5\"H     Pelvic tilts 20X 20X     Quadruped rocking for hip flexion  10X     Sustained psoas stretch-supine with 1-2 pillows under hips 1 pillow, 2 mins 2 pillows, 3 mins     NMES left quads with SAQ       Bent knee fallouts  Start visit 3-5     Left leg treadmill walking focusing on hip extension              Bike Seat 7   5 mins Seat 7     mins                                                                                         HEP        Access Code: [email or web address]  URL: [email or web address]  Date: 01/27/2025  Prepared by: Gaye Sarabia    Exercises  - Supine Gluteal Sets  - 1-2 x daily - 7 x weekly - 20 reps - 5 hold  - Supine Quad Set  - 1 x daily - 7 x weekly - 20 reps - 5 hold  - Supine Isometric Hamstring Set (Mirrored)  - 1-2 x daily - 7 x weekly - 20 reps - 5 hold  - Supine Straight Leg Hip Adduction and Quad Set with Ball  - 1-2 x daily - 7 x weekly - 20 reps - 5 hold  - Hooklying Isometric Clamshell  - 1-2 x daily - 7 x weekly - 20 reps - 5 hold  - Supine Posterior Pelvic Tilt  - 1 x daily - 7 x weekly - 20 reps - 5 hold  - Supine Bridge  - 1-2 x daily - 7 x weekly - 20 reps - 5 hold    ASSESSMENT  Continues to have moderate muscle guarding during left hip PROM. Improved gait pattern with improved hip extension during gait.    PLAN  The pt will be seen 2 days a week for 3 weeks then transfer of care to PT in [place], [place].    The pt has been educated about the risks and benefits of physical therapy and gives consent for treatment.     The patient will benefit from physical therapy treatment to include:  STM, JM, PROM/AAROM/AROM left hip, strengthening, proprioception and gait training.    Goals:  Educate patient regarding proper gait mechanics using crutches and 50% WB through left LE on level ground and stairs-1-2 visits  Manage scarring around " portal sites and prevent dowurznet-3-2 weeks  Progress weight bearing and gait training to wean patient off of crutches and avoid abnormal gait jyatswpu-2-5 weeks  Transfer care to PT in [place], [place] where patient lives-3-4 weeks

## 2025-02-03 ENCOUNTER — TREATMENT (OUTPATIENT)
Dept: PHYSICAL THERAPY | Facility: CLINIC | Age: 25
End: 2025-02-03
Payer: COMMERCIAL

## 2025-02-03 DIAGNOSIS — S73.192D ACETABULAR LABRUM TEAR, LEFT, SUBSEQUENT ENCOUNTER: Primary | ICD-10-CM

## 2025-02-03 DIAGNOSIS — S73.192D TEAR OF LEFT ACETABULAR LABRUM, SUBSEQUENT ENCOUNTER: ICD-10-CM

## 2025-02-03 PROCEDURE — 97140 MANUAL THERAPY 1/> REGIONS: CPT | Mod: GP | Performed by: PHYSICAL THERAPIST

## 2025-02-03 PROCEDURE — 97110 THERAPEUTIC EXERCISES: CPT | Mod: GP | Performed by: PHYSICAL THERAPIST

## 2025-02-03 ASSESSMENT — PAIN SCALES - GENERAL: PAINLEVEL_OUTOF10: 3

## 2025-02-03 ASSESSMENT — PAIN - FUNCTIONAL ASSESSMENT: PAIN_FUNCTIONAL_ASSESSMENT: 0-10

## 2025-02-03 NOTE — PROGRESS NOTES
Physical Therapy    Physical Therapy Treatment    Patient Name: Mina Leavitt  MRN: [id number]  : 2000   Today's Date: 2/3/2025  Time Calculation  Start Time: 1030  Stop Time: 1135  Time Calculation (min): 65 min     PT Therapeutic Procedures Time Entry  Manual Therapy Time Entry: 20  Therapeutic Exercise Time Entry: 45             Visit: 3  Auth: 25 visits. Auth not required    Current Problem  Problem List Items Addressed This Visit             ICD-10-CM    Acetabular labrum tear, left, subsequent encounter - Primary S73.192D        General  Name and  confirmed with patient on this date.           Precautions  Precautions  Precautions Comment: See Dr. Lynn's protocol       Pain  Pain Assessment: 0-10  0-10 (Numeric) Pain Score: 3  Pain Location: Hip  Pain Orientation: Left    SUBJECTIVE:   Pt reports that overall he is doing really well, but continues to have soreness in front and lateral area of hip when riding bike for a few minutes. Has seat raised as high as it can go.      Current limitations: prolonged standing or walking  Home setup: Apartment in [place]. Lives on 2nd floor  Work:   Patients goal: Back to normal function and exercise    OBJECTIVE:      Lower Extremity PROM: WNL unless documented below:  ROM in Degrees  RIGHT LEFT   Hip Flexion  107   Hip Extension     Hip Abduction  20   Hip Adduction     Hip ER  20   Hip IR                             Gait mechanics: Pt is ambulating with two axillary crutches, brace and approximately 50% WB through left LE with improved left hip extension.      Functional Outcome:        TREATMENT:  EXERCISES Date 25 Date 25 Date 2/3/25 Date    REPS REPS REPS REPS   PROM left hip 10 mins 10 mins 10 mins    No ER > 20 degrees until 2/15/25       Hip circles  10X ea 10X ea    STM left hip-rectus,quads,iliopsoas,glut med/min,piriformis,TFL,adductors Next visit 10 mins 10 mins           Crutch training, stair climbing, brace fitting  "completed             Isometrics-gluts,quad, HS, adduction,abduction 5\"H X 20 each 5\"H X 20 each 5\"H X 20    Supine log rolling educated      Supine bridging 20 X 5\"H 20 X 5\"H 20 X 5\"H     Pelvic tilts 20X 20X 20X    Quadruped rocking for hip flexion  10X 20X    Sustained psoas stretch-supine with 1-2 pillows under hips 1 pillow, 2 mins 2 pillows, 3 mins     NMES left quads with SAQ       Bent knee fallouts  Start visit 3-5 Limited ER per protocol 10X    Left leg treadmill walking focusing on hip extension  0.6 mph X 5 mins 0.8 mph X 9 mins           Bike Seat 7   5 mins Seat 7    10 mins Seat 7 10 mins           Starting 2/7       clamshells       Plank-modified PRN, side plank       Hip abduction       Hip extension       Prone hip rotation       Prone hip flexor stretch       Prone lying       Hip hiking (3-4 weeks)       Single leg bridging       sidestepping       retrowalking        Access Code: [email or web address]  URL: [email or web address]  Date: 01/27/2025  Prepared by: Gaye Sarabia    Exercises  - Supine Gluteal Sets  - 1-2 x daily - 7 x weekly - 20 reps - 5 hold  - Supine Quad Set  - 1 x daily - 7 x weekly - 20 reps - 5 hold  - Supine Isometric Hamstring Set (Mirrored)  - 1-2 x daily - 7 x weekly - 20 reps - 5 hold  - Supine Straight Leg Hip Adduction and Quad Set with Ball  - 1-2 x daily - 7 x weekly - 20 reps - 5 hold  - Hooklying Isometric Clamshell  - 1-2 x daily - 7 x weekly - 20 reps - 5 hold  - Supine Posterior Pelvic Tilt  - 1 x daily - 7 x weekly - 20 reps - 5 hold  - Supine Bridge  - 1-2 x daily - 7 x weekly - 20 reps - 5 hold    ASSESSMENT  Left hip flexion ROM improved 26 degrees since last visit. Overall progressing well but continues to have muscle guarding with hip flexion and IR.    PLAN  The pt will be seen 2 days a week for 3-4 weeks then transfer of care to PT in [place], [place].    The pt has been educated about the risks and benefits of physical therapy and gives consent for " treatment.     The patient will benefit from physical therapy treatment to include:  STM, JM, PROM/AAROM/AROM left hip, strengthening, proprioception and gait training.    Goals:  Educate patient regarding proper gait mechanics using crutches and 50% WB through left LE on level ground and stairs-1-2 visits  Manage scarring around portal sites and prevent pnbtuboxj-7-4 weeks  Progress weight bearing and gait training to wean patient off of crutches and avoid abnormal gait pvuiimqv-5-8 weeks  Transfer care to PT in [place], [place] where patient lives-3-4 weeks

## 2025-02-05 ENCOUNTER — TREATMENT (OUTPATIENT)
Dept: PHYSICAL THERAPY | Facility: CLINIC | Age: 25
End: 2025-02-05
Payer: COMMERCIAL

## 2025-02-05 DIAGNOSIS — S73.192D TEAR OF LEFT ACETABULAR LABRUM, SUBSEQUENT ENCOUNTER: ICD-10-CM

## 2025-02-05 DIAGNOSIS — S73.192D ACETABULAR LABRUM TEAR, LEFT, SUBSEQUENT ENCOUNTER: Primary | ICD-10-CM

## 2025-02-05 PROCEDURE — 97140 MANUAL THERAPY 1/> REGIONS: CPT | Mod: GP | Performed by: PHYSICAL THERAPIST

## 2025-02-05 PROCEDURE — 97110 THERAPEUTIC EXERCISES: CPT | Mod: GP | Performed by: PHYSICAL THERAPIST

## 2025-02-05 ASSESSMENT — PAIN - FUNCTIONAL ASSESSMENT: PAIN_FUNCTIONAL_ASSESSMENT: 0-10

## 2025-02-05 NOTE — PROGRESS NOTES
Physical Therapy    Physical Therapy Treatment    Patient Name: Mina Leavitt  MRN: [id number]  : 2000   Today's Date: 2025  Time Calculation  Start Time: 1035  Stop Time: 1140  Time Calculation (min): 65 min     PT Therapeutic Procedures Time Entry  Manual Therapy Time Entry: 20  Therapeutic Exercise Time Entry: 45             Visit: 4  Auth: 25 visits. Auth not required    Current Problem  Problem List Items Addressed This Visit             ICD-10-CM    Acetabular labrum tear, left, subsequent encounter - Primary S73.192D        General  Name and  confirmed with patient on this date.           Precautions  Precautions  Precautions Comment: See Dr. Lynn's protocol       Pain  Pain Assessment: 0-10    SUBJECTIVE:   Pt reports that overall he is doing really well, but continues to have soreness in front and lateral area of hip when riding bike for a few minutes. Has seat raised as high as it can go.      Current limitations: prolonged standing or walking  Home setup: Apartment in [place]. Lives on 2nd floor  Work:   Patients goal: Back to normal function and exercise    OBJECTIVE:      Lower Extremity PROM: WNL unless documented below:  ROM in Degrees  RIGHT LEFT   Hip Flexion  115   Hip Extension     Hip Abduction  20   Hip Adduction     Hip ER  20   Hip IR                             Gait mechanics: Pt is ambulating with two axillary crutches, brace and approximately 50% WB through left LE with improved left hip extension.      Functional Outcome:        TREATMENT:  EXERCISES Date 25 Date 25 Date 2/3/25 Date 25    REPS REPS REPS REPS   PROM left hip 10 mins 10 mins 10 mins 10 mins   No ER > 20 degrees until 2/15/25       Hip circles  10X ea 10X ea 10X   STM left hip-rectus,quads,iliopsoas,glut med/min,piriformis,TFL,adductors Next visit 10 mins 10 mins 10 mins          Crutch training, stair climbing, brace fitting completed             Isometrics-gluts,quad, HS,  "adduction,abduction 5\"H X 20 each 5\"H X 20 each 5\"H X 20 5\"H X 20   Supine log rolling educated      Supine bridging 20 X 5\"H 20 X 5\"H 20 X 5\"H  20 X 5\"H   Pelvic tilts 20X 20X 20X 20X   Quadruped rocking for hip flexion  10X 20X 20X   Sustained psoas stretch-supine with 1-2 pillows under hips 1 pillow, 2 mins 2 pillows, 3 mins home    NMES left quads with SAQ       Bent knee fallouts  Start visit 3-5 Limited ER per protocol 10X Limited ER per protocol 10X   Left leg treadmill walking focusing on hip extension  0.6 mph X 5 mins 0.8 mph X 9 mins 0.8 mph X 10 mins          Bike Seat 7   5 mins Seat 7    10 mins Seat 7 10 mins Seat 7 10 mins          Starting 2/7       clamshells       Plank-modified PRN, side plank       Hip abduction       Hip extension       Prone hip rotation       Prone hip flexor stretch       Prone lying       Hip hiking (3-4 weeks)       Single leg bridging       sidestepping       retrowalking        Access Code: [email or web address]  URL: [email or web address]  Date: 01/27/2025  Prepared by: Gaye Sarabia    Exercises  - Supine Gluteal Sets  - 1-2 x daily - 7 x weekly - 20 reps - 5 hold  - Supine Quad Set  - 1 x daily - 7 x weekly - 20 reps - 5 hold  - Supine Isometric Hamstring Set (Mirrored)  - 1-2 x daily - 7 x weekly - 20 reps - 5 hold  - Supine Straight Leg Hip Adduction and Quad Set with Ball  - 1-2 x daily - 7 x weekly - 20 reps - 5 hold  - Hooklying Isometric Clamshell  - 1-2 x daily - 7 x weekly - 20 reps - 5 hold  - Supine Posterior Pelvic Tilt  - 1 x daily - 7 x weekly - 20 reps - 5 hold  - Supine Bridge  - 1-2 x daily - 7 x weekly - 20 reps - 5 hold    ASSESSMENT  Left hip flexion ROM continues to improve gradually. Less stiffness with transitional movements like sit to stand. Overall doing very well. Plans on returning to work remotely next week.     PLAN  The pt will be seen 2 days a week for 3-4 weeks then transfer of care to PT in [place], [place].    The pt has been " educated about the risks and benefits of physical therapy and gives consent for treatment.     The patient will benefit from physical therapy treatment to include:  STM, JM, PROM/AAROM/AROM left hip, strengthening, proprioception and gait training.    Goals:  Educate patient regarding proper gait mechanics using crutches and 50% WB through left LE on level ground and stairs-1-2 visits  Manage scarring around portal sites and prevent njydkjind-8-5 weeks  Progress weight bearing and gait training to wean patient off of crutches and avoid abnormal gait okueosms-0-0 weeks  Transfer care to PT in [place], [place] where patient lives-3-4 weeks

## 2025-02-07 ENCOUNTER — APPOINTMENT (OUTPATIENT)
Dept: ORTHOPEDIC SURGERY | Facility: HOSPITAL | Age: 25
End: 2025-02-07
Payer: COMMERCIAL

## 2025-02-07 ENCOUNTER — OFFICE VISIT (OUTPATIENT)
Dept: ORTHOPEDIC SURGERY | Facility: HOSPITAL | Age: 25
End: 2025-02-07
Payer: COMMERCIAL

## 2025-02-07 DIAGNOSIS — S73.192D TEAR OF LEFT ACETABULAR LABRUM, SUBSEQUENT ENCOUNTER: Primary | ICD-10-CM

## 2025-02-07 DIAGNOSIS — S43.432A SUPERIOR LABRUM ANTERIOR-TO-POSTERIOR (SLAP) TEAR OF LEFT SHOULDER: ICD-10-CM

## 2025-02-07 PROCEDURE — 99211 OFF/OP EST MAY X REQ PHY/QHP: CPT | Performed by: PHYSICIAN ASSISTANT

## 2025-02-07 NOTE — PROGRESS NOTES
L Hip 1/24/25      HIP POST OP  The patient returns status post Left hip arthroscopy on 1/24/25.  They are doing well.  They have no evidence of lower extremity DVT.  Incisions are clean and dry.  Healing well.  Their pain is appropriate.  Range of motion is within normal limits.    HE will be heading back to [place] and will start PT at [facility].  RX and protocol given to patient.  He also mentions rt shoulder pain he has been dealing with and would like to continue PT.  Order placed to include this.  I will plan to see them back 6 weeks from surgery.        Coral Galan PA-C

## 2025-02-07 NOTE — LETTER
February 7, 2025     Patient: Mina Leavitt   YOB: 2000   Date of Visit: 2/7/2025       To Whom It May Concern:    It is my medical opinion that Mina Leavitt can return to work on Monday 2/10/2025.  Limit his hours to 20-30 hours his first week back. Starting the week of 2/17/2025, he can work up to 40 hours if tolerated.  Limit hours as needed secondarily to pain.    If you have any questions or concerns, please don't hesitate to call.         Sincerely,        Coral Galan PA-C    CC: No Recipients

## 2025-02-12 ENCOUNTER — TREATMENT (OUTPATIENT)
Dept: PHYSICAL THERAPY | Facility: CLINIC | Age: 25
End: 2025-02-12
Payer: COMMERCIAL

## 2025-02-12 DIAGNOSIS — S73.192D TEAR OF LEFT ACETABULAR LABRUM, SUBSEQUENT ENCOUNTER: ICD-10-CM

## 2025-02-12 DIAGNOSIS — S73.192D ACETABULAR LABRUM TEAR, LEFT, SUBSEQUENT ENCOUNTER: Primary | ICD-10-CM

## 2025-02-12 PROCEDURE — 97110 THERAPEUTIC EXERCISES: CPT | Mod: GP | Performed by: PHYSICAL THERAPIST

## 2025-02-12 PROCEDURE — 97140 MANUAL THERAPY 1/> REGIONS: CPT | Mod: GP | Performed by: PHYSICAL THERAPIST

## 2025-02-12 ASSESSMENT — PAIN - FUNCTIONAL ASSESSMENT: PAIN_FUNCTIONAL_ASSESSMENT: 0-10

## 2025-02-12 ASSESSMENT — PAIN SCALES - GENERAL: PAINLEVEL_OUTOF10: 5 - MODERATE PAIN

## 2025-02-12 NOTE — PROGRESS NOTES
Physical Therapy    Physical Therapy Treatment    Patient Name: Mina Leavitt  MRN: [id number]  : 2000   Today's Date: 2025  Time Calculation  Start Time: 0250  Stop Time: 0  Time Calculation (min): 70 min     PT Therapeutic Procedures Time Entry  Manual Therapy Time Entry: 20  Therapeutic Exercise Time Entry: 50             Visit: 5  Auth: 25 visits. Auth not required    Current Problem  Problem List Items Addressed This Visit             ICD-10-CM    Acetabular labrum tear, left, subsequent encounter - Primary S73.192D        General  Name and  confirmed with patient on this date.           Precautions  Precautions  Precautions Comment: See Dr. Lynn's protocol       Pain  Pain Assessment: 0-10  0-10 (Numeric) Pain Score: 5 - Moderate pain  Pain Location: Hip  Pain Orientation: Left    SUBJECTIVE: Pt reports that his hip has been a little more sore since his brace was discontinued.    Current limitations: prolonged standing or walking  Home setup: Apartment in [place]. Lives on 2nd floor  Work:   Patients goal: Back to normal function and exercise    OBJECTIVE:      Lower Extremity PROM: WNL unless documented below:  ROM in Degrees  RIGHT LEFT   Hip Flexion  120   Hip Extension     Hip Abduction  45   Hip Adduction     Hip ER  50   Hip IR                             Gait mechanics:   Pt ambulates with one crutch at slow alina. No deviation.    Functional Outcome:        TREATMENT:  EXERCISES Date 25       REPS      PROM left hip 10 mins             Hip circles       STM left hip-rectus,quads,iliopsoas,glut med/min,piriformis,TFL,adductors 10 mins             Gait training              Isometrics-gluts,quad, HS, adduction,abduction HEP      Supine log rolling HEP      Supine bridging HEP      Pelvic tilts HEP      Quadruped rocking for hip flexion 10X      Sustained psoas stretch-supine with 1-2 pillows under hips HEP             Bent knee fallouts 10X       treadmill  "walking focusing on hip extension 1.2 mph X 10 mins             Bike Seat 6  10 mins             Starting 2/7       clamshells 10 X 2      Plank-modified PRN, side plank next      Hip abduction 10 X 2      Hip extension 10 X 2      Prone hip rotation 10X      Prone hip flexor stretch 10\"H x 5      Prone lying 5 mins      Hip hiking (3-4 weeks)       Single leg bridging next      sidestepping next      retrowalking 50' X 2       Access Code: [email or web address]  URL: [email or web address]  Date: 02/12/2025  Prepared by: Gaye Sarabia    Exercises  - Clamshell  - 1 x daily - 7 x weekly - 2 sets - 10 reps  - Standing Hip Abduction with Counter Support  - 1 x daily - 7 x weekly - 2 sets - 10 reps  - Standing Hip Extension with Counter Support  - 1 x daily - 7 x weekly - 2 sets - 10 reps  - Prone Hip Flexor Stretch on Table with Strap  - 1 x daily - 7 x weekly - 3 reps - 20-30 hold  - Prone Hip External Rotation AROM  - 1 x daily - 7 x weekly - 10 reps  - Prone Hip Internal Rotation AROM  - 1 x daily - 7 x weekly - 10 reps    ASSESSMENT  Pt with increased muscle guarding and soreness since discontinuing hip brace especially in hip flexors. PROM improved in all planes after STM.    PLAN  The pt will be seen 2 days a week for 3-4 weeks then transfer of care to PT in [place], [place].    The pt has been educated about the risks and benefits of physical therapy and gives consent for treatment.     The patient will benefit from physical therapy treatment to include:  STM, JM, PROM/AAROM/AROM left hip, strengthening, proprioception and gait training.    Goals:  Educate patient regarding proper gait mechanics using crutches and 50% WB through left LE on level ground and stairs-1-2 visits-GOAL MET  Manage scarring around portal sites and prevent eugxpltqw-2-4 weeks-2/12/25 PROGRESSING  Progress weight bearing and gait training to wean patient off of crutches and avoid abnormal gait zgberobr-5-0 weeks-2/12/25 " PROGRESSING  Transfer care to PT in [place], [place] where patient lives-3-4 weeks

## 2025-02-14 ENCOUNTER — TREATMENT (OUTPATIENT)
Dept: PHYSICAL THERAPY | Facility: CLINIC | Age: 25
End: 2025-02-14
Payer: COMMERCIAL

## 2025-02-14 DIAGNOSIS — S73.192D ACETABULAR LABRUM TEAR, LEFT, SUBSEQUENT ENCOUNTER: Primary | ICD-10-CM

## 2025-02-14 DIAGNOSIS — S73.192D TEAR OF LEFT ACETABULAR LABRUM, SUBSEQUENT ENCOUNTER: ICD-10-CM

## 2025-02-14 PROCEDURE — 97140 MANUAL THERAPY 1/> REGIONS: CPT | Mod: GP | Performed by: PHYSICAL THERAPIST

## 2025-02-14 PROCEDURE — 97110 THERAPEUTIC EXERCISES: CPT | Mod: GP | Performed by: PHYSICAL THERAPIST

## 2025-02-14 ASSESSMENT — PAIN SCALES - GENERAL: PAINLEVEL_OUTOF10: 3

## 2025-02-14 ASSESSMENT — PAIN - FUNCTIONAL ASSESSMENT: PAIN_FUNCTIONAL_ASSESSMENT: 0-10

## 2025-02-14 NOTE — PROGRESS NOTES
Physical Therapy    Physical Therapy Treatment/Discharge    Patient Name: Mina Leavitt  MRN: [id number]  : 2000   Today's Date: 2025  Time Calculation  Start Time: 1120  Stop Time: 1230  Time Calculation (min): 70 min     PT Therapeutic Procedures Time Entry  Manual Therapy Time Entry: 20  Therapeutic Exercise Time Entry: 50             Visit: 6  Auth: 25 visits. Auth not required    Current Problem  Problem List Items Addressed This Visit             ICD-10-CM    Acetabular labrum tear, left, subsequent encounter - Primary S73.192D        General  Name and  confirmed with patient on this date.           Precautions  Precautions  Precautions Comment: See Dr. Lynn's protocol       Pain  Pain Assessment: 0-10  0-10 (Numeric) Pain Score: 3  Pain Location: Hip  Pain Orientation: Left    SUBJECTIVE: Pt reports that his hip has been a little sore since RTW and sitting for longer periods of time. Feels ready to wean from one crutch. Pt reports that he will be moving back to [place] this weekend.    Current limitations: prolonged standing or walking  Home setup: Apartment in [place]. Lives on 2nd floor  Work:   Patients goal: Back to normal function and exercise    OBJECTIVE:      Lower Extremity PROM: WNL unless documented below:  ROM in Degrees  RIGHT LEFT   Hip Flexion  120   Hip Extension     Hip Abduction  50   Hip Adduction     Hip ER  50   Hip IR                             Gait mechanics:   Pt ambulates    Functional Outcome:        TREATMENT:  EXERCISES Date 2/12/25 2/15/25      REPS      PROM left hip 10 mins 10 mins            Hip circles       STM left hip-rectus,quads,iliopsoas,glut med/min,piriformis,TFL,adductors 10 mins 10 mins            Gait training              Isometrics-gluts,quad, HS, adduction,abduction HEP      Supine log rolling HEP      Supine bridging HEP      Pelvic tilts HEP      Quadruped rocking for hip flexion 10X 10X     Sustained psoas stretch-supine  "with 1-2 pillows under hips HEP             Bent knee fallouts 10X 10X      treadmill walking focusing on hip extension 1.2 mph X 10 mins             Bike Seat 6  10 mins Seat 6 10 mins            Starting 2/7       clamshells 10 X 2 10 X 2     Plank-modified PRN, side plank next 15\" X      Hip abduction 10 X 2 10 X 2     Hip extension 10 X 2 10 X 2     Prone hip rotation 10X 10X     Prone hip flexor stretch 10\"H x 5 10\"H X 5     Prone lying 5 mins 5 mins     Hip hiking (3-4 weeks)       Single leg bridging next 10 X 2     sidestepping next      retrowalking 50' X 2 50' X 2      Access Code: [email or web address]  URL: [email or web address]  Date: 02/12/2025  Prepared by: Gaye Sarabia    Exercises  - Clamshell  - 1 x daily - 7 x weekly - 2 sets - 10 reps  - Standing Hip Abduction with Counter Support  - 1 x daily - 7 x weekly - 2 sets - 10 reps  - Standing Hip Extension with Counter Support  - 1 x daily - 7 x weekly - 2 sets - 10 reps  - Prone Hip Flexor Stretch on Table with Strap  - 1 x daily - 7 x weekly - 3 reps - 20-30 hold  - Prone Hip External Rotation AROM  - 1 x daily - 7 x weekly - 10 reps  - Prone Hip Internal Rotation AROM  - 1 x daily - 7 x weekly - 10 reps    ASSESSMENT  Continues to have muscle guarding in left hip flexor, otherwise progressing as expected. Will wean away from one crutch over the next few days. Pt is moving back to [place] this weekend and will continue PT there. Has an appointment set up for next week. Pt was give a copy of Dr. Lynn's hip arthroscopy protocol to bring to PT.    PLAN  Discharge from PT at our office. Will continue PT in [place].    Goals:  Educate patient regarding proper gait mechanics using crutches and 50% WB through left LE on level ground and stairs-1-2 visits-GOAL MET  Manage scarring around portal sites and prevent puljgaxfy-1-9 weeks-2/14/25 PROGRESSING  Progress weight bearing and gait training to wean patient off of crutches and avoid abnormal " gait tplgxbzs-0-3 weeks-2/12/25 -GOAL MET  Transfer care to PT in [place], [place] where patient lives-3-4 weeks-GOAL MET

## 2025-02-17 ENCOUNTER — APPOINTMENT (OUTPATIENT)
Dept: PHYSICAL THERAPY | Facility: CLINIC | Age: 25
End: 2025-02-17
Payer: COMMERCIAL

## 2025-02-17 DIAGNOSIS — S73.192D ACETABULAR LABRUM TEAR, LEFT, SUBSEQUENT ENCOUNTER: Primary | ICD-10-CM

## 2025-02-17 DIAGNOSIS — S73.192D TEAR OF LEFT ACETABULAR LABRUM, SUBSEQUENT ENCOUNTER: ICD-10-CM

## 2025-02-19 ENCOUNTER — APPOINTMENT (OUTPATIENT)
Dept: PHYSICAL THERAPY | Facility: CLINIC | Age: 25
End: 2025-02-19
Payer: COMMERCIAL

## 2025-02-19 DIAGNOSIS — S73.192D ACETABULAR LABRUM TEAR, LEFT, SUBSEQUENT ENCOUNTER: Primary | ICD-10-CM

## 2025-02-19 DIAGNOSIS — S73.192D TEAR OF LEFT ACETABULAR LABRUM, SUBSEQUENT ENCOUNTER: ICD-10-CM

## 2025-02-24 ENCOUNTER — APPOINTMENT (OUTPATIENT)
Dept: PHYSICAL THERAPY | Facility: CLINIC | Age: 25
End: 2025-02-24
Payer: COMMERCIAL

## 2025-02-24 DIAGNOSIS — S73.192D ACETABULAR LABRUM TEAR, LEFT, SUBSEQUENT ENCOUNTER: Primary | ICD-10-CM

## 2025-02-24 DIAGNOSIS — S73.192D TEAR OF LEFT ACETABULAR LABRUM, SUBSEQUENT ENCOUNTER: ICD-10-CM

## 2025-02-26 ENCOUNTER — APPOINTMENT (OUTPATIENT)
Dept: PHYSICAL THERAPY | Facility: CLINIC | Age: 25
End: 2025-02-26
Payer: COMMERCIAL

## 2025-02-26 DIAGNOSIS — S73.192D ACETABULAR LABRUM TEAR, LEFT, SUBSEQUENT ENCOUNTER: Primary | ICD-10-CM

## 2025-02-26 DIAGNOSIS — S73.192D TEAR OF LEFT ACETABULAR LABRUM, SUBSEQUENT ENCOUNTER: ICD-10-CM

## 2025-03-07 ENCOUNTER — OFFICE VISIT (OUTPATIENT)
Dept: ORTHOPEDIC SURGERY | Facility: HOSPITAL | Age: 25
End: 2025-03-07
Payer: COMMERCIAL

## 2025-03-07 VITALS — BODY MASS INDEX: 19.1 KG/M2 | WEIGHT: 126 LBS | HEIGHT: 68 IN

## 2025-03-07 DIAGNOSIS — S73.192D TEAR OF LEFT ACETABULAR LABRUM, SUBSEQUENT ENCOUNTER: Primary | ICD-10-CM

## 2025-03-07 PROCEDURE — 3008F BODY MASS INDEX DOCD: CPT | Performed by: SPECIALIST/TECHNOLOGIST

## 2025-03-07 PROCEDURE — 99211 OFF/OP EST MAY X REQ PHY/QHP: CPT | Performed by: SPECIALIST/TECHNOLOGIST

## 2025-03-07 PROCEDURE — 1036F TOBACCO NON-USER: CPT | Performed by: SPECIALIST/TECHNOLOGIST

## 2025-03-07 ASSESSMENT — PAIN DESCRIPTION - DESCRIPTORS: DESCRIPTORS: ACHING

## 2025-03-07 ASSESSMENT — PAIN SCALES - GENERAL: PAINLEVEL_OUTOF10: 1

## 2025-03-07 ASSESSMENT — PAIN - FUNCTIONAL ASSESSMENT: PAIN_FUNCTIONAL_ASSESSMENT: 0-10

## 2025-03-07 NOTE — PROGRESS NOTES
The patient returns 6 weeks out from their Left hip arthroscopy on 1/20/2025.  Overall he is doing okay.  He states that he has occasional soreness with prolonged sitting however this is continually improving and he is able to sit for longer periods of time without difficulties.  He lives in [place] and during his drive in today he was doing fine.  He continues with formal physical therapy at [facility] [address] in [place].  He is concerned with the quality of his postoperative rehab compared to the [facility] location.  He denies adverse events or issues since last visit.    The patient and I discussed his clinical presentation 6 weeks status post left hip arthroscopy.  Overall he is doing quite well.  His range of motion is symmetric bilaterally.  He is a little tight with internal rotation on his left hip compared to his right hip however the range of motion is full.  His strength continues to improve.  We discussed his current care at [facility] [address] physical therapy and they are doing appropriate care for him.  His range of motion is symmetric.  His strength continues to improve.  His pain continues to decrease.  However, if he wishes to try a new physical therapy location we discussed the [facility] physical therapy.  He will consider this and let us know if he would like to pursue this new location.  I encouraged him to continue working on his cardiovascular strength and endurance continue with his flexibility regimen especially as he turns toward strengthening phase of his postoperative rehab.  He will follow-up in 6 weeks for clinical recheck.  He desires to return to the generalized weight room activities and racquet sports.  He is in agreement the plan.  Questions are answered.       Ki Levy PA-C

## 2025-04-17 ENCOUNTER — TELEPHONE (OUTPATIENT)
Dept: ORTHOPEDIC SURGERY | Facility: HOSPITAL | Age: 25
End: 2025-04-17
Payer: COMMERCIAL

## 2025-04-17 NOTE — TELEPHONE ENCOUNTER
Called patient to let him know that Syed would not be in office on 4/25 and therefore his appointment would have to be rescheduled. Patient was successfully rescheduled to 4/21/25 at 1:30 PM with  Coral at [facility]. CT

## 2025-04-21 ENCOUNTER — OFFICE VISIT (OUTPATIENT)
Dept: ORTHOPEDIC SURGERY | Facility: HOSPITAL | Age: 25
End: 2025-04-21
Payer: COMMERCIAL

## 2025-04-21 DIAGNOSIS — S73.192D TEAR OF LEFT ACETABULAR LABRUM, SUBSEQUENT ENCOUNTER: Primary | ICD-10-CM

## 2025-04-21 PROCEDURE — 99211 OFF/OP EST MAY X REQ PHY/QHP: CPT | Performed by: PHYSICIAN ASSISTANT

## 2025-04-21 NOTE — PROGRESS NOTES
Left hip arthroscopy 1/20/25  Continues PT in [place], feels his range of motion and strength are increasing well.  Has some pain the day after doing agility work with PT.       Patient is here today 3 months out from his left hip arthroscopy.  Date of surgery 1/20/2025.  Overall he is doing really well.  At his last visit he was felt to have some stiffness and soreness.  They discussed possibly switching physical therapy locations.  He states he did not need to switch locations as his rehab got better.  He has great range of motion and no complaints of any pain.  He will continue strengthening and can start to slowly incorporate impact activities within our rehab protocol.  I will see him back in 2 months to discuss returning to Golf.      Coral Galan PA-C

## 2025-04-25 ENCOUNTER — APPOINTMENT (OUTPATIENT)
Dept: ORTHOPEDIC SURGERY | Facility: HOSPITAL | Age: 25
End: 2025-04-25
Payer: COMMERCIAL

## 2025-06-20 ENCOUNTER — OFFICE VISIT (OUTPATIENT)
Dept: ORTHOPEDIC SURGERY | Facility: HOSPITAL | Age: 25
End: 2025-06-20
Payer: COMMERCIAL

## 2025-06-20 ENCOUNTER — HOSPITAL ENCOUNTER (OUTPATIENT)
Dept: RADIOLOGY | Facility: HOSPITAL | Age: 25
Discharge: HOME | End: 2025-06-20
Payer: COMMERCIAL

## 2025-06-20 DIAGNOSIS — S73.192D TEAR OF LEFT ACETABULAR LABRUM, SUBSEQUENT ENCOUNTER: ICD-10-CM

## 2025-06-20 PROCEDURE — 73502 X-RAY EXAM HIP UNI 2-3 VIEWS: CPT | Mod: LT

## 2025-06-20 PROCEDURE — 99213 OFFICE O/P EST LOW 20 MIN: CPT | Performed by: SPECIALIST/TECHNOLOGIST

## 2025-06-20 PROCEDURE — 73502 X-RAY EXAM HIP UNI 2-3 VIEWS: CPT | Mod: LEFT SIDE | Performed by: RADIOLOGY

## 2025-06-20 PROCEDURE — 1036F TOBACCO NON-USER: CPT | Performed by: SPECIALIST/TECHNOLOGIST

## 2025-06-20 ASSESSMENT — PAIN - FUNCTIONAL ASSESSMENT: PAIN_FUNCTIONAL_ASSESSMENT: 0-10

## 2025-06-20 ASSESSMENT — PAIN SCALES - GENERAL: PAINLEVEL_OUTOF10: 2

## 2025-06-20 NOTE — PROGRESS NOTES
The patient returns 21 weeks out from their Left hip arthroscopy on 1/24/2025.  Overall, he is doing okay.  He has been feeling well.  He feels like he has plateaued in his postoperative recovery.  Formal physical therapy is complete and he has not been doing it for about a month or so at this point.  He does note some continued soreness especially after golf and workouts.  He states that it will linger for a few days.  He denies a specific injury or trauma.  He continues to deny numbness or tingling down the left lower extremity.      The patient I discussed his clinical presentation approximately 5 months status post right left hip arthroscopy.  Overall, his range of motion is symmetric bilaterally.  His strength is symmetric bilaterally.  Bilateral lower extremity compartments are soft and supple.  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  He continues to have the inferior osteophyte over his femoral head and known hip dysplasia.  He has previously seen Dr. Valadez who thought that a isolated hip arthroscopy could adequately relieve his symptoms.  He is not able to take oral anti-inflammatory medication secondary to an NSAID allergy which causes facial edema and swelling.  I did ask him to reach out to his primary care physician if they feel it is okay for him to use topical Voltaren gel.  We did provide him with a new physical therapy referral specifically targeting deep tissue modalities, soft tissue manipulation and dry needling and various other modalities as they feel necessary.  We discussed the use of a potential left hip intra-articular corticosteroid injection to see if this helps relieve his symptoms.  We also discussed a follow-up with Dr. Valadez in the future if his symptoms continue to persist or even worsen.  We will see him back in 8 weeks for clinical recheck.  He is in agreement the plan.  His questions are answered.        Ki Lvey,  BRADLEY

## 2025-09-15 ENCOUNTER — APPOINTMENT (OUTPATIENT)
Dept: ORTHOPEDIC SURGERY | Facility: HOSPITAL | Age: 25
End: 2025-09-15
Payer: COMMERCIAL

## (undated) DEVICE — DRILL, ICONIX, 1.4MM, DISPOSABLE

## (undated) DEVICE — ADAPTER, Y TUBING STERILE

## (undated) DEVICE — MAT, FLOOR, SURGISAFE, FLUID CONTROL, 46X40

## (undated) DEVICE — TUBING, OUTFLOW, DRAIN PIPE, W/ONE WAY VALVE (FMS VUE)

## (undated) DEVICE — TUBING, NFLOW, FMS VUE, SNGL USE

## (undated) DEVICE — DRAPE, C-ARM, W/12 IN COVER, LI XTRAY TUBE

## (undated) DEVICE — CANNULA, 7 X 110

## (undated) DEVICE — Device

## (undated) DEVICE — SUTURE, ETHILON, 3-0, 18 IN, PS1, BLACK

## (undated) DEVICE — KIT, HIP POSTFREE, MEDIUM, GUARDIAN

## (undated) DEVICE — BLADE, CURVED HIP, SHARP TIP, NS

## (undated) DEVICE — SYRINGE, 10 CC, LUER LOCK

## (undated) DEVICE — ENTRY KIT, PORTAL

## (undated) DEVICE — KIT, HIP FRACTURES AND SCOPES, CUSTOM, AHUJA

## (undated) DEVICE — BLADE, GREAT WHITE CONCAVE, 4.2MM, PREBENT

## (undated) DEVICE — BUR, SPHERICAL, 4.5MM, PREBENT

## (undated) DEVICE — DRAPE, INSTRUMENT, W/POUCH, STERI DRAPE, 9 5/8 X 18 LONG

## (undated) DEVICE — GLOVE, SURGICAL, PROTEXIS PI MICRO, 8.0, PF, LF

## (undated) DEVICE — ARTHROWAND, MULTIVAC 50XL, ICW

## (undated) DEVICE — BUR, SPHERICAL, 5.5MM, PREBENT

## (undated) DEVICE — SLINGSHOT, 45 DEG UP

## (undated) DEVICE — FORCE FIBER P2,  BLUE CO-BRSAID, 38IN STRAND

## (undated) DEVICE — GLOVE, SURGICAL, PROTEXIS PI W/NEU-THERA, 8.5, PF, LF

## (undated) DEVICE — SUTURE TAPE, XBRAID, 1.4MM BLACK/WHITE

## (undated) DEVICE — GLOVE, SURGICAL, PROTEXIS PI BLUE W/NEUTHERA, 7.0, PF, LF

## (undated) DEVICE — CANNULA, FLOWPORT II, WITH OBTURATOR

## (undated) DEVICE — SUTURE MANAGER, NANOPASSER, CRESCENT